# Patient Record
Sex: MALE
[De-identification: names, ages, dates, MRNs, and addresses within clinical notes are randomized per-mention and may not be internally consistent; named-entity substitution may affect disease eponyms.]

---

## 2018-01-20 ENCOUNTER — HOSPITAL ENCOUNTER (EMERGENCY)
Dept: HOSPITAL 88 - ER | Age: 38
Discharge: HOME | End: 2018-01-20
Payer: COMMERCIAL

## 2018-01-20 VITALS — HEIGHT: 65 IN | BODY MASS INDEX: 28.99 KG/M2 | WEIGHT: 174 LBS

## 2018-01-20 VITALS — DIASTOLIC BLOOD PRESSURE: 96 MMHG | SYSTOLIC BLOOD PRESSURE: 136 MMHG

## 2018-01-20 DIAGNOSIS — J11.1: ICD-10-CM

## 2018-01-20 DIAGNOSIS — R50.9: Primary | ICD-10-CM

## 2018-01-20 DIAGNOSIS — R05: ICD-10-CM

## 2018-01-20 LAB
ALBUMIN SERPL-MCNC: 3.7 G/DL (ref 3.5–5)
ALBUMIN/GLOB SERPL: 0.9 {RATIO} (ref 0.8–2)
ALP SERPL-CCNC: 115 IU/L (ref 40–150)
ALT SERPL-CCNC: 13 IU/L (ref 0–55)
ANION GAP SERPL CALC-SCNC: 13.4 MMOL/L (ref 8–16)
BASOPHILS # BLD AUTO: 0 10*3/UL (ref 0–0.1)
BASOPHILS NFR BLD AUTO: 0.5 % (ref 0–1)
BUN SERPL-MCNC: 18 MG/DL (ref 7–26)
BUN/CREAT SERPL: 3 (ref 6–25)
CALCIUM SERPL-MCNC: 8.7 MG/DL (ref 8.4–10.2)
CHLORIDE SERPL-SCNC: 103 MMOL/L (ref 98–107)
CO2 SERPL-SCNC: 30 MMOL/L (ref 22–29)
DEPRECATED NEUTROPHILS # BLD AUTO: 2.5 10*3/UL (ref 2.1–6.9)
EGFRCR SERPLBLD CKD-EPI 2021: 11 ML/MIN (ref 60–?)
EOSINOPHIL # BLD AUTO: 0.2 10*3/UL (ref 0–0.4)
EOSINOPHIL NFR BLD AUTO: 4.1 % (ref 0–6)
ERYTHROCYTE [DISTWIDTH] IN CORD BLOOD: 14.8 % (ref 11.7–14.4)
FLUAV + FLUBV AG SPEC IF: (no result)
GLOBULIN PLAS-MCNC: 3.9 G/DL (ref 2.3–3.5)
GLUCOSE SERPLBLD-MCNC: 102 MG/DL (ref 74–118)
HCT VFR BLD AUTO: 38.3 % (ref 38.2–49.6)
HGB BLD-MCNC: 12.9 G/DL (ref 14–18)
LYMPHOCYTES # BLD: 0.7 10*3/UL (ref 1–3.2)
LYMPHOCYTES NFR BLD AUTO: 16.7 % (ref 18–39.1)
MCH RBC QN AUTO: 31.5 PG (ref 28–32)
MCHC RBC AUTO-ENTMCNC: 33.7 G/DL (ref 31–35)
MCV RBC AUTO: 93.4 FL (ref 81–99)
MONOCYTES # BLD AUTO: 0.6 10*3/UL (ref 0.2–0.8)
MONOCYTES NFR BLD AUTO: 15.4 % (ref 4.4–11.3)
NEUTS SEG NFR BLD AUTO: 63 % (ref 38.7–80)
PLATELET # BLD AUTO: 108 X10E3/UL (ref 140–360)
POTASSIUM SERPL-SCNC: 3.4 MMOL/L (ref 3.5–5.1)
RBC # BLD AUTO: 4.1 X10E6/UL (ref 4.3–5.7)
S PYO AG THROAT QL: NEGATIVE
SODIUM SERPL-SCNC: 143 MMOL/L (ref 136–145)

## 2018-01-20 PROCEDURE — 71046 X-RAY EXAM CHEST 2 VIEWS: CPT

## 2018-01-20 PROCEDURE — 87400 INFLUENZA A/B EACH AG IA: CPT

## 2018-01-20 PROCEDURE — 99284 EMERGENCY DEPT VISIT MOD MDM: CPT

## 2018-01-20 PROCEDURE — 87070 CULTURE OTHR SPECIMN AEROBIC: CPT

## 2018-01-20 PROCEDURE — 36415 COLL VENOUS BLD VENIPUNCTURE: CPT

## 2018-01-20 PROCEDURE — 85025 COMPLETE CBC W/AUTO DIFF WBC: CPT

## 2018-01-20 PROCEDURE — 83518 IMMUNOASSAY DIPSTICK: CPT

## 2018-01-20 PROCEDURE — 71020: CPT

## 2018-01-20 PROCEDURE — 80053 COMPREHEN METABOLIC PANEL: CPT

## 2018-01-20 NOTE — DIAGNOSTIC IMAGING REPORT
EXAMINATION: Chest,  CHEST 2 VIEWS    



INDICATION: Cough.



COMPARISON:  Chest 2 views 11/20/2017

     

FINDINGS:    



LINES:  None.



Heart:  Normal cardiac silhouette.



Vascular: The pulmonary vasculature is within normal limits.  



Mediastinum: No mediastinal, hilar, or axillary mass or lymphadenopathy.



Lungs: No parenchymal mass.  No focal consolidation.



Pleura:  No pleural effusion.  No pneumothorax.



Bones: No acute osseous abnormality.  



Soft tissues:  Normal.



Impression: 

No acute radiographic abnormality.



Signed by: Dr. Jorge Graves M.D. on 1/20/2018 1:18 PM

## 2018-03-12 ENCOUNTER — HOSPITAL ENCOUNTER (INPATIENT)
Dept: HOSPITAL 88 - ER | Age: 38
LOS: 2 days | Discharge: HOME | DRG: 177 | End: 2018-03-14
Attending: INTERNAL MEDICINE | Admitting: INTERNAL MEDICINE
Payer: COMMERCIAL

## 2018-03-12 VITALS — DIASTOLIC BLOOD PRESSURE: 97 MMHG | SYSTOLIC BLOOD PRESSURE: 150 MMHG

## 2018-03-12 VITALS — SYSTOLIC BLOOD PRESSURE: 166 MMHG | DIASTOLIC BLOOD PRESSURE: 102 MMHG

## 2018-03-12 VITALS — SYSTOLIC BLOOD PRESSURE: 156 MMHG | DIASTOLIC BLOOD PRESSURE: 89 MMHG

## 2018-03-12 VITALS — BODY MASS INDEX: 31.13 KG/M2 | WEIGHT: 198.37 LBS | HEIGHT: 67 IN

## 2018-03-12 VITALS — DIASTOLIC BLOOD PRESSURE: 89 MMHG | SYSTOLIC BLOOD PRESSURE: 156 MMHG

## 2018-03-12 VITALS — DIASTOLIC BLOOD PRESSURE: 101 MMHG | SYSTOLIC BLOOD PRESSURE: 163 MMHG

## 2018-03-12 VITALS — SYSTOLIC BLOOD PRESSURE: 150 MMHG | DIASTOLIC BLOOD PRESSURE: 97 MMHG

## 2018-03-12 DIAGNOSIS — I50.9: ICD-10-CM

## 2018-03-12 DIAGNOSIS — I13.2: ICD-10-CM

## 2018-03-12 DIAGNOSIS — J69.0: Primary | ICD-10-CM

## 2018-03-12 DIAGNOSIS — J15.9: ICD-10-CM

## 2018-03-12 DIAGNOSIS — N25.81: ICD-10-CM

## 2018-03-12 DIAGNOSIS — I25.10: ICD-10-CM

## 2018-03-12 DIAGNOSIS — Z99.2: ICD-10-CM

## 2018-03-12 DIAGNOSIS — N18.6: ICD-10-CM

## 2018-03-12 LAB
ALBUMIN SERPL-MCNC: 3.1 G/DL (ref 3.5–5)
ALBUMIN/GLOB SERPL: 0.8 {RATIO} (ref 0.8–2)
ALP SERPL-CCNC: 92 IU/L (ref 40–150)
ALT SERPL-CCNC: 11 IU/L (ref 0–55)
ANION GAP SERPL CALC-SCNC: 16.8 MMOL/L (ref 8–16)
BACTERIA URNS QL MICRO: (no result) /HPF
BASOPHILS # BLD AUTO: 0 10*3/UL (ref 0–0.1)
BASOPHILS NFR BLD AUTO: 0.6 % (ref 0–1)
BILIRUB UR QL: NEGATIVE
BNP BLD-MCNC: 1786.5 PG/ML (ref 0–100)
BUN SERPL-MCNC: 30 MG/DL (ref 7–26)
BUN/CREAT SERPL: 3 (ref 6–25)
CALCIUM SERPL-MCNC: 7.8 MG/DL (ref 8.4–10.2)
CHLORIDE SERPL-SCNC: 95 MMOL/L (ref 98–107)
CK MB SERPL-MCNC: 0.3 NG/ML (ref 0–5)
CK SERPL-CCNC: 49 IU/L (ref 30–200)
CK SERPL-CCNC: 54 IU/L (ref 30–200)
CK SERPL-CCNC: 65 IU/L (ref 30–200)
CLARITY UR: CLEAR
CO2 SERPL-SCNC: 28 MMOL/L (ref 22–29)
COLOR UR: YELLOW
DEPRECATED APTT PLAS QN: 36.4 SECONDS (ref 23.8–35.5)
DEPRECATED INR PLAS: 1.23
DEPRECATED NEUTROPHILS # BLD AUTO: 5.2 10*3/UL (ref 2.1–6.9)
DEPRECATED RBC URNS MANUAL-ACNC: (no result) /HPF (ref 0–5)
EGFRCR SERPLBLD CKD-EPI 2021: 6 ML/MIN (ref 60–?)
EOSINOPHIL # BLD AUTO: 0.3 10*3/UL (ref 0–0.4)
EOSINOPHIL NFR BLD AUTO: 3.7 % (ref 0–6)
EPI CELLS URNS QL MICRO: (no result) /LPF
ERYTHROCYTE [DISTWIDTH] IN CORD BLOOD: 13.3 % (ref 11.7–14.4)
GLOBULIN PLAS-MCNC: 4 G/DL (ref 2.3–3.5)
GLUCOSE SERPLBLD-MCNC: 100 MG/DL (ref 74–118)
HCT VFR BLD AUTO: 30.1 % (ref 38.2–49.6)
HGB BLD-MCNC: 10.2 G/DL (ref 14–18)
KETONES UR QL STRIP.AUTO: NEGATIVE
LEUKOCYTE ESTERASE UR QL STRIP.AUTO: NEGATIVE
LYMPHOCYTES # BLD: 0.8 10*3/UL (ref 1–3.2)
LYMPHOCYTES NFR BLD AUTO: 11.8 % (ref 18–39.1)
MAGNESIUM SERPL-MCNC: 1.9 MG/DL (ref 1.3–2.1)
MCH RBC QN AUTO: 30.2 PG (ref 28–32)
MCHC RBC AUTO-ENTMCNC: 33.9 G/DL (ref 31–35)
MCV RBC AUTO: 89.1 FL (ref 81–99)
MONOCYTES # BLD AUTO: 0.8 10*3/UL (ref 0.2–0.8)
MONOCYTES NFR BLD AUTO: 10.6 % (ref 4.4–11.3)
NEUTS SEG NFR BLD AUTO: 72.9 % (ref 38.7–80)
NITRITE UR QL STRIP.AUTO: NEGATIVE
PLATELET # BLD AUTO: 91 X10E3/UL (ref 140–360)
POTASSIUM SERPL-SCNC: 3.8 MMOL/L (ref 3.5–5.1)
PROT UR QL STRIP.AUTO: (no result)
PROTHROMBIN TIME: 14.6 SECONDS (ref 11.9–14.5)
RBC # BLD AUTO: 3.38 X10E6/UL (ref 4.3–5.7)
SODIUM SERPL-SCNC: 136 MMOL/L (ref 136–145)
SP GR UR STRIP: 1.01 (ref 1.01–1.02)
UROBILINOGEN UR STRIP-MCNC: 0.2 MG/DL (ref 0.2–1)
WBC #/AREA URNS HPF: (no result) /HPF (ref 0–5)

## 2018-03-12 PROCEDURE — 93306 TTE W/DOPPLER COMPLETE: CPT

## 2018-03-12 PROCEDURE — 87071 CULTURE AEROBIC QUANT OTHER: CPT

## 2018-03-12 PROCEDURE — 84484 ASSAY OF TROPONIN QUANT: CPT

## 2018-03-12 PROCEDURE — 85730 THROMBOPLASTIN TIME PARTIAL: CPT

## 2018-03-12 PROCEDURE — 71046 X-RAY EXAM CHEST 2 VIEWS: CPT

## 2018-03-12 PROCEDURE — 87086 URINE CULTURE/COLONY COUNT: CPT

## 2018-03-12 PROCEDURE — 85610 PROTHROMBIN TIME: CPT

## 2018-03-12 PROCEDURE — 5A1D70Z PERFORMANCE OF URINARY FILTRATION, INTERMITTENT, LESS THAN 6 HOURS PER DAY: ICD-10-PCS

## 2018-03-12 PROCEDURE — 36415 COLL VENOUS BLD VENIPUNCTURE: CPT

## 2018-03-12 PROCEDURE — 82553 CREATINE MB FRACTION: CPT

## 2018-03-12 PROCEDURE — 87070 CULTURE OTHR SPECIMN AEROBIC: CPT

## 2018-03-12 PROCEDURE — 71045 X-RAY EXAM CHEST 1 VIEW: CPT

## 2018-03-12 PROCEDURE — 99284 EMERGENCY DEPT VISIT MOD MDM: CPT

## 2018-03-12 PROCEDURE — 87186 SC STD MICRODIL/AGAR DIL: CPT

## 2018-03-12 PROCEDURE — 70450 CT HEAD/BRAIN W/O DYE: CPT

## 2018-03-12 PROCEDURE — 87400 INFLUENZA A/B EACH AG IA: CPT

## 2018-03-12 PROCEDURE — 85025 COMPLETE CBC W/AUTO DIFF WBC: CPT

## 2018-03-12 PROCEDURE — 83880 ASSAY OF NATRIURETIC PEPTIDE: CPT

## 2018-03-12 PROCEDURE — 83735 ASSAY OF MAGNESIUM: CPT

## 2018-03-12 PROCEDURE — 82550 ASSAY OF CK (CPK): CPT

## 2018-03-12 PROCEDURE — 80053 COMPREHEN METABOLIC PANEL: CPT

## 2018-03-12 PROCEDURE — 83605 ASSAY OF LACTIC ACID: CPT

## 2018-03-12 PROCEDURE — 96365 THER/PROPH/DIAG IV INF INIT: CPT

## 2018-03-12 PROCEDURE — 87340 HEPATITIS B SURFACE AG IA: CPT

## 2018-03-12 PROCEDURE — 96374 THER/PROPH/DIAG INJ IV PUSH: CPT

## 2018-03-12 PROCEDURE — 87040 BLOOD CULTURE FOR BACTERIA: CPT

## 2018-03-12 PROCEDURE — 93005 ELECTROCARDIOGRAM TRACING: CPT

## 2018-03-12 PROCEDURE — 87205 SMEAR GRAM STAIN: CPT

## 2018-03-12 PROCEDURE — 81001 URINALYSIS AUTO W/SCOPE: CPT

## 2018-03-12 PROCEDURE — 90962 ESRD SERV 1 VISIT P MO 20+: CPT

## 2018-03-12 RX ADMIN — SODIUM CHLORIDE PRN MG: 900 INJECTION INTRAVENOUS at 13:15

## 2018-03-12 RX ADMIN — TAZOBACTAM SODIUM AND PIPERACILLIN SODIUM SCH MLS/HR: 250; 2 INJECTION, SOLUTION INTRAVENOUS at 14:18

## 2018-03-12 RX ADMIN — SEVELAMER CARBONATE SCH MG: 800 TABLET, FILM COATED ORAL at 17:00

## 2018-03-12 RX ADMIN — SODIUM CHLORIDE PRN MG: 900 INJECTION INTRAVENOUS at 16:35

## 2018-03-12 RX ADMIN — CLINDAMYCIN PHOSPHATE SCH MLS/HR: 18 INJECTION, SOLUTION INTRAVENOUS at 04:51

## 2018-03-12 RX ADMIN — CLINDAMYCIN PHOSPHATE SCH MLS/HR: 18 INJECTION, SOLUTION INTRAVENOUS at 05:42

## 2018-03-12 RX ADMIN — Medication PRN MG: at 16:12

## 2018-03-12 RX ADMIN — Medication PRN MG: at 21:17

## 2018-03-12 RX ADMIN — TAZOBACTAM SODIUM AND PIPERACILLIN SODIUM SCH MLS/HR: 250; 2 INJECTION, SOLUTION INTRAVENOUS at 21:17

## 2018-03-12 RX ADMIN — Medication SCH MG: at 16:11

## 2018-03-12 RX ADMIN — SODIUM CHLORIDE SCH GM: 9 INJECTION, SOLUTION INTRAVENOUS at 16:12

## 2018-03-12 RX ADMIN — Medication PRN MG: at 22:22

## 2018-03-12 RX ADMIN — SODIUM CHLORIDE SCH GM: 9 INJECTION, SOLUTION INTRAVENOUS at 04:51

## 2018-03-12 NOTE — DIAGNOSTIC IMAGING REPORT
EXAMINATION:  CHEST 2 VIEWS    



INDICATION:           Cough, fever, history chest pain. 



COMPARISON:  1/20/2018

     

FINDINGS:

TUBES and LINES:  None.



LUNGS:  Lungs are not well inflated.  Interlobular septi thickening is noted by

the presence of Kerley B lines   Multifocal airspace disease predominantly

involving the lung bases and right middle lobe suspicious for multifocal

infection



PLEURA:  Trace of right pleural effusion present



HEART AND MEDIASTINUM:  Cardiac size is mildly enlarged.    



BONES AND SOFT TISSUES:  No acute osseous lesion.  Soft tissues are

unremarkable.



UPPER ABDOMEN: No free air under the diaphragm.    



IMPRESSION: 

1.  Findings are compatible with multifocal pneumonia. Follow-up until

resolution is recommended

2.  Fluid overload and trace of right pleural effusion is also present.



Signed by: Dr. Max Becker M.D. on 3/12/2018 4:18 AM

## 2018-03-12 NOTE — XMS REPORT
Patient Summary Document

 Created on: 2018



BORIS PURI

External Reference #: [id number]

: 1980

Sex: Male



Demographics







 Address  [address] [place] [address]  [address]

 

 Home Phone  [phone]

 

 Preferred Language  Unknown

 

 Marital Status  Unknown

 

 Congregational Affiliation  Unknown

 

 Race  Unknown

 

 Additional Race(s) 

 

 

 Ethnic Group  Unknown





Author







 Author  [facility]

 

 Address  Unknown

 

 Phone  Unavailable







Care Team Providers







 Care Team Member Name  Role  Phone

 

 YENIFER COTTRELL  Unavailable  Unavailable

 

 NAKUL SARMIENTO  Unavailable  Unavailable

 

 NOREEN FRIED  Unavailable  Unavailable

 

 BIGG LAMBERT  Unavailable  Unavailable







Problems

This patient has no known problems.



Allergies, Adverse Reactions, Alerts

This patient has no known allergies or adverse reactions.



Medications

This patient has no known medications.



Results







 Test Description  Test Time  Test Comments  Text Results  Atomic Results  
Result Comments









 FLOW PRA CLASS I AND II  2017-10-09 12:08:00       









   

 

 DATE OF SERUM (BEAKER) (test code=)  531739      

 

 SERUM # (BEAKER) (test code=2290)  445918      

 

 FLOW PRA CLASS I AND II (test code=2421)  See Scanned Report      





FLOW PRA CLASS I AND II2017-07-10 13:10:00* 





 Test Item  Value  Reference Range  Comments

 

 DATE OF SERUM (BEAKER) (test code=)  905900      

 

 SERUM # (BEAKER) (test code=2290)  035208      

 

 FLOW PRA CLASS I AND II (test code=2421)  See Scanned Report      





HEPATITIS B SURFACE [id number] 14:12:00* 





 Test Item  Value  Reference Range  Comments

 

 HEPATITIS B SURFACE ANTIBODY (BEAKER) (test code=647)  139.4 mIU/mL  <8.0   





FLOW PRA CLASS I AND [id number] 06:49:00* 





 Test Item  Value  Reference Range  Comments

 

 DATE OF SERUM (BEAKER) (test code=)  968630      

 

 SERUM # (BEAKER) (test code=2290)  545985      

 

 FLOW PRA CLASS I AND II (test code=2421)  See Scanned Report      





CHEST 2 VIEWS    [address] [address]      Patient Name: BORIS PURI   MR #: 
[id number]    : 1980 Age/Sex: 37/M  Acct #: [id number] Req #: [id number]
[id number]  Adm Physician:     Ordered by: CLARIBEL GARCIA NP  Report #: [id number]
[id number]   Location: ER  Room/Bed:     _____________________________________________
______________________________________________________    Procedure: [id number] 
DX/CHEST 2 VIEWS  Exam Date: 18                            Exam Time: 
1235       REPORT STATUS: Signed    EXAMINATION: Chest,  CHEST 2 VIEWS          
INDICATION: Cough.      COMPARISON:  Chest 2 views 2017           FINDINGS
:          LINES:  None.      Heart:  Normal cardiac silhouette.      Vascular: 
The pulmonary vasculature is within normal limits.        Mediastinum: No 
mediastinal, hilar, or axillary mass or lymphadenopathy.      Lungs: No 
parenchymal mass.  No focal consolidation.      Pleura:  No pleural effusion.  
No pneumothorax.      Bones: No acute osseous abnormality.        Soft tissues:
  Normal.      Impression:    No acute radiographic abnormality.      Signed by
: Dr. Celestine Pavon M.D. on 2018 1:18 PM        Dictated By: CELESTINE PAVON MD
  Electronically Signed By: CELESTINE PAVON MD on 18  Transcribed By: 
SHEFALI on 18       COPY TO:   CLARIBEL GARCIA NP        CHEST 2 
VIEWS    [place] [address]      Patient Name: BORIS PURI   MR #: [id number]    
: 1980 Age/Sex: 37/M  Acct #: [id number] Req #: [id number]  Adm 
Physician:     Ordered by: NAKUL SARMIENTO MD  Report #: [id number]   Location: 
RAD  Room/Bed:     _____________________________________________________________
______________________________________    Procedure: [id number] DX/CHEST 2 VIEWS
  Exam Date: 17                            Exam Time: 1045       REPORT 
STATUS: Signed    PROCEDURE:   CHEST 2 VIEWS   TECHNIQUE:   PA lateral chest   
INDICATION:   Followup pneumonia   COMPARISON:   None.       FINDINGS:   Near-
complete resolution of right upper lobe airspace opacity relative    to 2017. No cavitation. Lungs are otherwise clear. No pleural    effusions. Normal 
heart size. Intact skeleton.       CONCLUSION:   Interval improvement of 
airspace opacity at the peripheral right upper    lobe consistent with 
resolving pneumonia. There is incomplete    resolution and therefore additional 
followup chest radiograph is    recommended in 6 weeks.               Dictated 
by:  Ashwin Caal M.D. on 2017 at 11:10        Electronically 
approved by:  Ashwin Caal M.D. on 2017 at    11:10                
Dictated By: ASHWIN CAAL MD  Electronically Signed By: ASHWIN CAAL MD on 
17 1110  Transcribed By: ROBIN on 17 1110       COPY TO:   NAKUL SARMIENTO MD        CT CHEST W    [place] [address]      Patient Name: BORIS PURI   MR #: [id number]    : 1980 Age/Sex: 37/M  Acct #: [id number] Req 
#: [id number]  Adm Physician: NOREEN FRIED MD    Ordered by: BEENA ANDREWS MD  
Report #: [id number]   Location: MED/SURG2  Room/Bed: [facility]    __________________
________________________________________________________________________________
_    Procedure: [id number] CT/CT CHEST W  Exam Date: 10/23/17                   
         Exam Time: 2301       REPORT STATUS: Signed    EXAM: CT CHEST W   DATE
: 10/23/2017 6:40 PM  Time stamp on exam: 2305 hours   INDICATION:  Pleurisy, 
end-stage renal disease, pneumonia   COMPARISON:  PA and lateral view of the 
chest 2017   TECHNIQUE: Multidetector CT scanning of the chest was 
performed.  Coronal and   sagittal multiplanar reformations were obtained. 
Routine protocol performed.   IV Contrast: 100 cc Isovue-370   CTDIvol has been 
reviewed. It is below the limits set by the Radiation Protocol   Committee (RPC)
.      FINDINGS:   LUNGS AND AIRWAYS:    There is a consolidation in the 
posterior aspect of the right upper lung along   the fissure. Within the 
consolidation is a central bubbly lucency measuring   approximately 1.5 cm (
sagittal image 31).      Nonspecific 6 mm nodule in the right upper lung 
anteriorly (series 3, image   48).      Linear atelectasis/scarring in the 
right middle lobe, lingula and bilateral   lung bases. Bibasilar bronchial 
thickening.      PLEURA: Trace bilateral pleural effusions.      HEART, 
MEDIASTINUM, VESSELS: Several mediastinal, subcarinal and right hilar   
subcentimeter lymph nodes consistent with reactive lymph nodes. The heart and   
great vessels are unremarkable.      UPPER ABDOMEN: No acute finding.      
MUSCULOSKELETAL: No acute findings.      IMPRESSION:   Findings are consistent 
with pneumonia in the right upper lung. If symptoms do   not improve, follow-up 
CT is recommended for possible early necrosis/abscess   formation within the 
consolidation.            Signed by: Dr. Eileen Parish M.D. on 10/24/2017 
12:16 AM        Dictated By: EILEEN PARISH MD  Electronically Signed By: EILEEN PARISH MD on 10/24/17 0016  Transcribed By: SHEFALI on 10/24/17 0016       
COPY TO:   BEENA ANDREWS MD        CHEST 2 VIEWS    [place] [address]      Patient 
Name: BORIS PURI   MR #: [id number]    : 1980 Age/Sex: 37/M  Acct #
: [id number] Req #: [id number]  Adm Physician:     Ordered by: BRIELLE WEEKS MD  Report #: [id number]   Location: ER  Room/Bed:     __________________________
_________________________________________________________________________    
Procedure: [id number] DX/CHEST 2 VIEWS  Exam Date: 10/22/17                     
       Exam Time:        REPORT STATUS: Signed    EXAMINATION:  CHEST 2 
VIEWS          INDICATION:           Upper back pain. Fever, vomiting for 2 
days       COMPARISON:  Chest x-ray on 2017 and 2013           
FINDINGS:   TUBES and LINES:  None.      LUNGS:  Lungs are not well inflated.  
Right lower lobe and right middle lobe   patchy airspace opacity with areas of 
nodularity   are compatible with evolving   infection.      PLEURA:  Focal 
right pleural thickening at the mid lung is suspicious for   loculated effusion 
versus pleural proliferative disease      HEART AND MEDIASTINUM:  The 
cardiomediastinal silhouette is unremarkable.          BONES AND SOFT TISSUES:  
No acute osseous lesion.  Soft tissues are   unremarkable.      UPPER ABDOMEN: 
No free air under the diaphragm.          IMPRESSION:    Findings are 
suspicious for infectious process in the right lower lobe with   loculated 
effusion. However, further evaluation with CT of the chest with IV   contrast 
is recommended to exclude the presence of underlying solid mass.         Signed 
by: Dr. Max Becker M.D. on 10/22/2017 10:20 PM        Dictated By: MAX CHAUDHARI MD  Electronically Signed By: MAX CHAUDHARI MD on 10/22/17 
2220  Transcribed By: SHEFALI on 10/22/17 2220       COPY TO:   BRIELLE WEEKS MD

## 2018-03-12 NOTE — HISTORY AND PHYSICAL
Mr. Landaverde is a pleasant  man whose 38th birthday is today.



CHIEF COMPLAINT:  He presented to the emergency room overnight with fever, 

cough, nausea and vomiting over the past several days.  



HISTORY OF PRESENT ILLNESS:  The patient reports he has dialysis on 

Tuesday, Thursday and Saturday.  He has attended these dialysis sessions 

without any problem but noted some nausea and vomiting and then cough.



PAST MEDICAL HISTORY:  Significant for a similar episode in October 2017 

when he was hospitalized at [facility] and found to have 

pneumonia.  He has chronic end-stage renal failure on hemodialysis.  He had 

severe hypertension before beginning dialysis.  He has had remote inguinal 

hernia repair.  



HOME MEDICATIONS:  Please see the chart.



PERSONAL AND SOCIAL HISTORY:  He does not smoke or drink, and he continues 

to work.



PHYSICAL EXAMINATION:  

GENERAL:  At this time shows a pleasant, alert  man who is 

comfortable. 

VITAL SIGNS:  Blood pressure is 150/90.  

HEENT:  Unremarkable. 

NECK:  No jugular venous distention, no bruits.

THORAX:  Heart sounds S1 and S2 are equal.  No murmurs.  Lungs are 

relatively clear. 

ABDOMEN:  Protuberant.  Normal bowel sounds.

EXTREMITIES:  With no cyanosis, clubbing or edema.  There is a functioning 

AV graft in the left arm.



PERTINENT LABORATORY STUDIES:   Show a BUN of 30 and creatinine 9.5, 

hemoglobin 10.2.  BNP is 1786.





ASSESSMENT:  

1. Pneumonia suggested by chest x-ray.

2. End-stage renal failure.

3. Congestive heart failure by BNP elevated with no previously known 

cardiac disease.  



PLAN:  The patient is on antibiotics.  Will ask for continued dialysis 

support and will check echocardiogram and consider further cardiac 

evaluation.













DD:  03/12/2018 12:20

DT:  03/12/2018 12:51

Job#:  [id number]

cc:MD BEENA JEREZ MD

## 2018-03-12 NOTE — DIAGNOSTIC IMAGING REPORT
Exam: Head CT without contrast

History:  Headache, vomiting

Comparison studies:  8/7/2014



Technique:

Axial images were obtained from the skull base to the vertex.

Coronal and sagittal images reconstructed from the axial data.

Intravenous contrast: None



Findings:



Scalp: No abnormalities.

Bones: No fractures, blastic or lytic lesions.



Brain sulci: Appropriate for age.

Ventricles: Normal in size and configuration. No hydrocephalus.

Extra-axial spaces: No masses, no fluid collection. 



Parenchyma: 

No abnormal densities. 

No masses, hemorrhage, acute or chronic vascular insults.



Sellar/suprasellar region: No abnormalities.

Craniocervical junction: Patent foramen magnum. No Chiari one malformation.



Incidental findings: 

None.



IMPRESSION:

 

1.  No acute intracranial abnormalities.



Signed by: Dr. Max Becker M.D. on 3/12/2018 4:13 AM

## 2018-03-13 VITALS — SYSTOLIC BLOOD PRESSURE: 140 MMHG | DIASTOLIC BLOOD PRESSURE: 95 MMHG

## 2018-03-13 VITALS — DIASTOLIC BLOOD PRESSURE: 85 MMHG | SYSTOLIC BLOOD PRESSURE: 128 MMHG

## 2018-03-13 VITALS — SYSTOLIC BLOOD PRESSURE: 147 MMHG | DIASTOLIC BLOOD PRESSURE: 95 MMHG

## 2018-03-13 VITALS — SYSTOLIC BLOOD PRESSURE: 141 MMHG | DIASTOLIC BLOOD PRESSURE: 95 MMHG

## 2018-03-13 VITALS — SYSTOLIC BLOOD PRESSURE: 144 MMHG | DIASTOLIC BLOOD PRESSURE: 88 MMHG

## 2018-03-13 VITALS — SYSTOLIC BLOOD PRESSURE: 140 MMHG | DIASTOLIC BLOOD PRESSURE: 87 MMHG

## 2018-03-13 LAB
ALBUMIN SERPL-MCNC: 2.6 G/DL (ref 3.5–5)
ALBUMIN/GLOB SERPL: 0.7 {RATIO} (ref 0.8–2)
ALP SERPL-CCNC: 74 IU/L (ref 40–150)
ALT SERPL-CCNC: 9 IU/L (ref 0–55)
ANION GAP SERPL CALC-SCNC: 14.2 MMOL/L (ref 8–16)
BASOPHILS # BLD AUTO: 0 10*3/UL (ref 0–0.1)
BASOPHILS NFR BLD AUTO: 0.4 % (ref 0–1)
BUN SERPL-MCNC: 29 MG/DL (ref 7–26)
BUN/CREAT SERPL: 3 (ref 6–25)
CALCIUM SERPL-MCNC: 7.3 MG/DL (ref 8.4–10.2)
CHLORIDE SERPL-SCNC: 97 MMOL/L (ref 98–107)
CO2 SERPL-SCNC: 28 MMOL/L (ref 22–29)
DEPRECATED NEUTROPHILS # BLD AUTO: 5.1 10*3/UL (ref 2.1–6.9)
EGFRCR SERPLBLD CKD-EPI 2021: 7 ML/MIN (ref 60–?)
EOSINOPHIL # BLD AUTO: 0.4 10*3/UL (ref 0–0.4)
EOSINOPHIL NFR BLD AUTO: 5 % (ref 0–6)
ERYTHROCYTE [DISTWIDTH] IN CORD BLOOD: 13.3 % (ref 11.7–14.4)
GLOBULIN PLAS-MCNC: 3.8 G/DL (ref 2.3–3.5)
GLUCOSE SERPLBLD-MCNC: 98 MG/DL (ref 74–118)
HCT VFR BLD AUTO: 27 % (ref 38.2–49.6)
HGB BLD-MCNC: 8.9 G/DL (ref 14–18)
LYMPHOCYTES # BLD: 0.8 10*3/UL (ref 1–3.2)
LYMPHOCYTES NFR BLD AUTO: 11 % (ref 18–39.1)
MCH RBC QN AUTO: 30.1 PG (ref 28–32)
MCHC RBC AUTO-ENTMCNC: 33 G/DL (ref 31–35)
MCV RBC AUTO: 91.2 FL (ref 81–99)
MONOCYTES # BLD AUTO: 0.9 10*3/UL (ref 0.2–0.8)
MONOCYTES NFR BLD AUTO: 12.5 % (ref 4.4–11.3)
NEUTS SEG NFR BLD AUTO: 70.8 % (ref 38.7–80)
PLATELET # BLD AUTO: 98 X10E3/UL (ref 140–360)
POTASSIUM SERPL-SCNC: 4.2 MMOL/L (ref 3.5–5.1)
RBC # BLD AUTO: 2.96 X10E6/UL (ref 4.3–5.7)
SODIUM SERPL-SCNC: 135 MMOL/L (ref 136–145)

## 2018-03-13 RX ADMIN — AZITHROMYCIN SCH MG: 250 TABLET, FILM COATED ORAL at 09:09

## 2018-03-13 RX ADMIN — TAZOBACTAM SODIUM AND PIPERACILLIN SODIUM SCH MLS/HR: 250; 2 INJECTION, SOLUTION INTRAVENOUS at 14:00

## 2018-03-13 RX ADMIN — Medication PRN MG: at 06:08

## 2018-03-13 RX ADMIN — SEVELAMER CARBONATE SCH MG: 800 TABLET, FILM COATED ORAL at 17:15

## 2018-03-13 RX ADMIN — SODIUM CHLORIDE SCH GM: 9 INJECTION, SOLUTION INTRAVENOUS at 04:17

## 2018-03-13 RX ADMIN — TAZOBACTAM SODIUM AND PIPERACILLIN SODIUM SCH MLS/HR: 250; 2 INJECTION, SOLUTION INTRAVENOUS at 05:49

## 2018-03-13 RX ADMIN — SEVELAMER CARBONATE SCH MG: 800 TABLET, FILM COATED ORAL at 09:09

## 2018-03-13 RX ADMIN — TAZOBACTAM SODIUM AND PIPERACILLIN SODIUM SCH MLS/HR: 250; 2 INJECTION, SOLUTION INTRAVENOUS at 22:05

## 2018-03-13 RX ADMIN — SEVELAMER CARBONATE SCH MG: 800 TABLET, FILM COATED ORAL at 12:00

## 2018-03-13 RX ADMIN — Medication PRN MG: at 22:49

## 2018-03-13 NOTE — DIAGNOSTIC IMAGING REPORT
CHEST SINGLE (PORTABLE), 3/13/2018 5:00 AM



Technique: CHEST SINGLE (PORTABLE)

Comparison: 3/12/2018

Clinical history: Pneumonia



Findings:

See Impression



Impression:

1. Stable mildly enlarged cardiac silhouette.

2. Increased bilateral diffuse opacities which could be due to edema and/or

infection.

3. Tiny bilateral effusions.



Signed by: Dr Airam King MD on 3/13/2018 6:39 AM

## 2018-03-14 VITALS — SYSTOLIC BLOOD PRESSURE: 148 MMHG | DIASTOLIC BLOOD PRESSURE: 94 MMHG

## 2018-03-14 VITALS — DIASTOLIC BLOOD PRESSURE: 91 MMHG | SYSTOLIC BLOOD PRESSURE: 144 MMHG

## 2018-03-14 VITALS — SYSTOLIC BLOOD PRESSURE: 138 MMHG | DIASTOLIC BLOOD PRESSURE: 88 MMHG

## 2018-03-14 VITALS — SYSTOLIC BLOOD PRESSURE: 151 MMHG | DIASTOLIC BLOOD PRESSURE: 98 MMHG

## 2018-03-14 RX ADMIN — AZITHROMYCIN SCH MG: 250 TABLET, FILM COATED ORAL at 09:11

## 2018-03-14 RX ADMIN — Medication SCH MG: at 09:12

## 2018-03-14 RX ADMIN — SEVELAMER CARBONATE SCH MG: 800 TABLET, FILM COATED ORAL at 08:49

## 2018-03-14 RX ADMIN — SEVELAMER CARBONATE SCH MG: 800 TABLET, FILM COATED ORAL at 12:30

## 2018-03-14 RX ADMIN — TAZOBACTAM SODIUM AND PIPERACILLIN SODIUM SCH MLS/HR: 250; 2 INJECTION, SOLUTION INTRAVENOUS at 06:01

## 2018-03-14 NOTE — DISCHARGE SUMMARY
HISTORY OF PRESENT ILLNESS:  Mr. Landaverde is a pleasant 38-year-old man who 

had his birthday during this hospitalization, who presented to the 

emergency room on the 12th with a complaint of cough and fever at home.



HOSPITAL COURSE:  Chest x-ray suggested multifocal pneumonia.  It appeared 

the patient had had some nausea and vomiting at home and may have aspirated 

gastric contents.



He was given broad-spectrum antibiotics, and he received dialysis on the 

12th and 13th.  Patient made rapid improvement on broad-spectrum antibiotic 

coverage and today is afebrile and feeling much better.  He is discharged 

to home to continue his previous medications of nifedipine and sevelamer.  

He will take azithromycin 250 mg daily for the next 5 days and will follow 

up with Dr. Tran on a regular basis and with Dr. Hines for dialysis.



DISCHARGE DIAGNOSES:  

1. Pneumonia.

2. Possible aspiration pneumonia.

3. End-stage renal failure.

4. Hypertension.  







 _________________________________

NOREEN FRIED MD



DD:  03/14/2018 13:02

DT:  03/14/2018 13:43

Job#:  [id number]

cc:ANTWAN HINES MD 

cc:BEENA ANDREWS MD

## 2018-03-22 ENCOUNTER — HOSPITAL ENCOUNTER (OUTPATIENT)
Dept: HOSPITAL 88 - ER | Age: 38
Setting detail: OBSERVATION
LOS: 1 days | Discharge: HOME | End: 2018-03-23
Attending: INTERNAL MEDICINE | Admitting: INTERNAL MEDICINE
Payer: COMMERCIAL

## 2018-03-22 VITALS — DIASTOLIC BLOOD PRESSURE: 98 MMHG | SYSTOLIC BLOOD PRESSURE: 153 MMHG

## 2018-03-22 VITALS — WEIGHT: 173 LBS | BODY MASS INDEX: 27.15 KG/M2 | HEIGHT: 67 IN

## 2018-03-22 VITALS — DIASTOLIC BLOOD PRESSURE: 99 MMHG | SYSTOLIC BLOOD PRESSURE: 157 MMHG

## 2018-03-22 DIAGNOSIS — Z99.2: ICD-10-CM

## 2018-03-22 DIAGNOSIS — D64.9: ICD-10-CM

## 2018-03-22 DIAGNOSIS — R51: ICD-10-CM

## 2018-03-22 DIAGNOSIS — I50.30: ICD-10-CM

## 2018-03-22 DIAGNOSIS — N18.6: ICD-10-CM

## 2018-03-22 DIAGNOSIS — I16.0: ICD-10-CM

## 2018-03-22 DIAGNOSIS — E66.3: ICD-10-CM

## 2018-03-22 DIAGNOSIS — J18.0: ICD-10-CM

## 2018-03-22 DIAGNOSIS — I13.2: Primary | ICD-10-CM

## 2018-03-22 LAB
ALBUMIN SERPL-MCNC: 3 G/DL (ref 3.5–5)
ALBUMIN/GLOB SERPL: 0.8 {RATIO} (ref 0.8–2)
ALP SERPL-CCNC: 94 IU/L (ref 40–150)
ALT SERPL-CCNC: 16 IU/L (ref 0–55)
ANION GAP SERPL CALC-SCNC: 14.2 MMOL/L (ref 8–16)
BACTERIA URNS QL MICRO: (no result) /HPF
BASOPHILS # BLD AUTO: 0 10*3/UL (ref 0–0.1)
BASOPHILS NFR BLD AUTO: 0.4 % (ref 0–1)
BILIRUB UR QL: NEGATIVE
BUN SERPL-MCNC: 40 MG/DL (ref 7–26)
BUN/CREAT SERPL: 5 (ref 6–25)
CALCIUM SERPL-MCNC: 8.1 MG/DL (ref 8.4–10.2)
CHLORIDE SERPL-SCNC: 100 MMOL/L (ref 98–107)
CK MB SERPL-MCNC: 0.6 NG/ML (ref 0–5)
CK SERPL-CCNC: 86 IU/L (ref 30–200)
CLARITY UR: (no result)
CO2 SERPL-SCNC: 29 MMOL/L (ref 22–29)
COLOR UR: YELLOW
DEPRECATED APTT PLAS QN: 34.1 SECONDS (ref 23.8–35.5)
DEPRECATED INR PLAS: 1.13
DEPRECATED NEUTROPHILS # BLD AUTO: 4.3 10*3/UL (ref 2.1–6.9)
DEPRECATED RBC URNS MANUAL-ACNC: (no result) /HPF (ref 0–5)
EGFRCR SERPLBLD CKD-EPI 2021: 7 ML/MIN (ref 60–?)
EOSINOPHIL # BLD AUTO: 0.6 10*3/UL (ref 0–0.4)
EOSINOPHIL NFR BLD AUTO: 8.3 % (ref 0–6)
EPI CELLS URNS QL MICRO: (no result) /LPF
ERYTHROCYTE [DISTWIDTH] IN CORD BLOOD: 14.1 % (ref 11.7–14.4)
FERRITIN SERPL-MCNC: 1501.59 NG/ML (ref 21.81–274.66)
GLOBULIN PLAS-MCNC: 3.7 G/DL (ref 2.3–3.5)
GLUCOSE SERPLBLD-MCNC: 97 MG/DL (ref 74–118)
HCT VFR BLD AUTO: 25.2 % (ref 38.2–49.6)
HGB BLD-MCNC: 8.4 G/DL (ref 14–18)
HYALINE CASTS #/AREA URNS LPF: (no result) /[LPF] (ref 0–1)
IRON SATN MFR SERPL: 28 % (ref 15–50)
IRON SERPL-MCNC: 76 UG/DL (ref 65–175)
KETONES UR QL STRIP.AUTO: NEGATIVE
LEUKOCYTE ESTERASE UR QL STRIP.AUTO: NEGATIVE
LYMPHOCYTES # BLD: 1.7 10*3/UL (ref 1–3.2)
LYMPHOCYTES NFR BLD AUTO: 23.3 % (ref 18–39.1)
MCH RBC QN AUTO: 29.6 PG (ref 28–32)
MCHC RBC AUTO-ENTMCNC: 33.3 G/DL (ref 31–35)
MCV RBC AUTO: 88.7 FL (ref 81–99)
MONOCYTES # BLD AUTO: 0.6 10*3/UL (ref 0.2–0.8)
MONOCYTES NFR BLD AUTO: 8.4 % (ref 4.4–11.3)
NEUTS SEG NFR BLD AUTO: 59.2 % (ref 38.7–80)
NITRITE UR QL STRIP.AUTO: NEGATIVE
PLATELET # BLD AUTO: 82 X10E3/UL (ref 140–360)
POTASSIUM SERPL-SCNC: 4.2 MMOL/L (ref 3.5–5.1)
PROT UR QL STRIP.AUTO: (no result)
PROTHROMBIN TIME: 13.6 SECONDS (ref 11.9–14.5)
RBC # BLD AUTO: 2.84 X10E6/UL (ref 4.3–5.7)
SODIUM SERPL-SCNC: 139 MMOL/L (ref 136–145)
SP GR UR STRIP: 1.01 (ref 1.01–1.02)
TIBC SERPL-MCNC: 270 UG/DL (ref 261–478)
TRANSFERRIN SERPL-MCNC: 193 MG/DL (ref 174–364)
UROBILINOGEN UR STRIP-MCNC: 0.2 MG/DL (ref 0.2–1)
WBC #/AREA URNS HPF: (no result) /HPF (ref 0–5)

## 2018-03-22 PROCEDURE — 71046 X-RAY EXAM CHEST 2 VIEWS: CPT

## 2018-03-22 PROCEDURE — 83540 ASSAY OF IRON: CPT

## 2018-03-22 PROCEDURE — 5A1D70Z PERFORMANCE OF URINARY FILTRATION, INTERMITTENT, LESS THAN 6 HOURS PER DAY: ICD-10-PCS

## 2018-03-22 PROCEDURE — 83880 ASSAY OF NATRIURETIC PEPTIDE: CPT

## 2018-03-22 PROCEDURE — 90962 ESRD SERV 1 VISIT P MO 20+: CPT

## 2018-03-22 PROCEDURE — 93005 ELECTROCARDIOGRAM TRACING: CPT

## 2018-03-22 PROCEDURE — 85730 THROMBOPLASTIN TIME PARTIAL: CPT

## 2018-03-22 PROCEDURE — 82553 CREATINE MB FRACTION: CPT

## 2018-03-22 PROCEDURE — 82728 ASSAY OF FERRITIN: CPT

## 2018-03-22 PROCEDURE — 85025 COMPLETE CBC W/AUTO DIFF WBC: CPT

## 2018-03-22 PROCEDURE — 84466 ASSAY OF TRANSFERRIN: CPT

## 2018-03-22 PROCEDURE — 83605 ASSAY OF LACTIC ACID: CPT

## 2018-03-22 PROCEDURE — 84484 ASSAY OF TROPONIN QUANT: CPT

## 2018-03-22 PROCEDURE — 99284 EMERGENCY DEPT VISIT MOD MDM: CPT

## 2018-03-22 PROCEDURE — 90937 HEMODIALYSIS REPEATED EVAL: CPT

## 2018-03-22 PROCEDURE — 87086 URINE CULTURE/COLONY COUNT: CPT

## 2018-03-22 PROCEDURE — 36415 COLL VENOUS BLD VENIPUNCTURE: CPT

## 2018-03-22 PROCEDURE — 80053 COMPREHEN METABOLIC PANEL: CPT

## 2018-03-22 PROCEDURE — 85610 PROTHROMBIN TIME: CPT

## 2018-03-22 PROCEDURE — 87040 BLOOD CULTURE FOR BACTERIA: CPT

## 2018-03-22 PROCEDURE — 81001 URINALYSIS AUTO W/SCOPE: CPT

## 2018-03-22 PROCEDURE — 82550 ASSAY OF CK (CPK): CPT

## 2018-03-22 RX ADMIN — LABETALOL HCL SCH MG: 100 TABLET, FILM COATED ORAL at 20:31

## 2018-03-22 RX ADMIN — SEVELAMER CARBONATE SCH MG: 800 TABLET, FILM COATED ORAL at 17:21

## 2018-03-22 RX ADMIN — LABETALOL HCL SCH MG: 100 TABLET, FILM COATED ORAL at 17:21

## 2018-03-22 RX ADMIN — HEPARIN SODIUM SCH UNIT: 5000 INJECTION INTRAVENOUS; SUBCUTANEOUS at 20:32

## 2018-03-22 RX ADMIN — SODIUM CHLORIDE SCH GM: 9 INJECTION, SOLUTION INTRAVENOUS at 20:31

## 2018-03-22 RX ADMIN — Medication SCH MG: at 20:31

## 2018-03-22 NOTE — DIAGNOSTIC IMAGING REPORT
EXAM: CHEST 2 VIEWS, PA and lateral

INDICATION: Shortness of breath, pneumonia

COMPARISON: AP view of the chest March 13, 2018



FINDINGS:

LINES/TUBES: None



LUNGS: Bilateral interstitial and alveolar opacities. 



PLEURA: No effusions or pneumothorax.



HEART AND MEDIASTINUM: Stable cardiomegaly and vascular congestion.



BONES AND SOFT TISSUES: No acute findings. 



IMPRESSION:

Findings most likely represent pulmonary edema. Atypical/viral infection is in

the differential.







Signed by: Dr. Vera Parish M.D. on 3/22/2018 6:58 AM

## 2018-03-22 NOTE — XMS REPORT
Continuity of Care Document

 Created on: 2018



BORIS LANDAVERDE

External Reference #: [id number]

: 1980

Sex: Male



Demographics







 Address  [address] [place]  [address]

 

 Home Phone  [phone]

 

 Preferred Language  Unknown

 

 Marital Status  Unknown

 

 Mormonism Affiliation  Unknown

 

 Race  Other

 

 Ethnic Group  Unknown





Author







 Author  [facility]

 

 Organization  [facility]

 

 Address  [address] [place]  [address]



 

 Phone  Unavailable







Support







 Name  Relationship  Address  Phone

 

 NAKUL SARMIENTO MD  Caregiver  [address] [place]  [address]  [phone]

 

 NAKUL SARMIENTO MD  Caregiver  [address] [place]  [address]  [phone]

 

 NOREEN FRIED MD  Caregiver  [address] [place] [address]  [address]  [phone]

 

 NOREEN FRIED MD  Caregiver  [address] [place] [address]  [address]  [phone]

 

 BRIELLE WEEKS MD  Caregiver  [address] [place] [address]  [address]  Unavailable

 

 DARIN LANDEROS  Next Of Kin  [address] [place] [address]  [phone]







Care Team Providers







 Care Team Member Name  Role  Phone

 

 NAKUL SARMIENTO MD  PCP  [phone]







Insurance Providers







 Guarantor  Boris Landaverde

 

 Address  [address] [place] [address]

 

 Phone  [phone]

 

 Email  [email or web address]











 Payer  [facility]

 

 Policy Number  [id number]

 

 Subscriber's Name  Boris Landaverde

 

 Relationship  18 Self / Same As Patient

 

 Group Name  [employer]AZUO

 

 Effective Date  18











 Payer  [employer]

 

 Policy Number  [id number]

 

 Subscriber's Name  Darin Landeros

 

 Relationship  01 Wife

 

 Group Number  [id number]

 

 Group Name  [employer]

 

 Effective Date  11







Advance Directives







 Directive  Response  Recorded Date/Time

 

 Does the patient have an advance directive?  No  18 8:30am

 

 If yes, is advance directive on file with [facility]?  No  18 8:30am

 

 If not on file with [facility] will patient provide a copy?  Yes  18 8:30am

 

 Do you have a Directive to Physician?  No  18 4:10am

 

 Do you have a Medical Power of ?  No  18 4:10am

 

 Do you have an out of hospital Do Not Resuscitate Order?  No  18 4:10am

 

 Do you have any special needs we should be aware of?  No  18 4:10am

 

 Do you have a support person here with you today?  Yes  18 4:10am

 

 Did patient receive Notice of Privacy Practices?  Yes  18 4:10am

 

 Did patient receive patient rights and responsibilities?  Yes  18 4:10am







Problems







 Medical Problem  Onset Date  Status

 

 Bronchitis  Unknown  Acute

 

 ESRD (end stage renal disease) on dialysis  Unknown   

 

 Fever  Unknown  Acute

 

 Influenza  Unknown  Acute

 

 Pneumonia  Unknown   

 

 Volume overload  Unknown   

 

 Vomiting  Unknown   

 

 Vomiting  Unknown   







Medications





Current Home Medications





 Medication  Dose  Units  Route  Directions  Days  Qty  Instructions  Start Date

 

 Azithromycin (Z-Ty) 250 Mg Tablet  250  Mg  Oral  Daily     1 Udpkt  Z-Pack   

 

 Nifedipine (Procardia Xl) 30 Mg Tab.er.24  30  Mg  Oral  Daily     30 Tab      

 

 Sevelamer Carbonate (Renvela) 0.8 Gm Powd.pack  3,200  Mg  Oral  Three Times 
Daily With Meals            









Past Home Medications





 Medication  Directions  Ordered  Status

 

 Cyclosporine, Modified (Neoral) 100 Mg Capsule, 125 Mg Oral  Twice A Day     
Discontinued

 

 Hydralazine Hcl 25 Mg Tab, 50 Mg Oral  Twice A Day     Discontinued

 

 Metoprolol Tartrate (Lopressor) 25 Mg Tab, 100 Mg Oral  Twice A Day     
Discontinued

 

 Prednisone 10 Mg Tab, 10 Mg Oral  Daily     Discontinued

 

 Prednisone 20 Mg Tablet,  Three Tabs Daily     Discontinued

 

 Sevelamer Carbonate (Renvela) 0.8 Gm Powd.pack, 800 Mg Oral  Four Times Daily 
    Discontinued







Social History







 Social History Problem  Response  Recorded Date/Time  Onset Date  Status

 

 Hx Psychiatric Problems  No  2018 8:30am  Not Applicable  Not Applicable

 

 Hx Eating Disorder  No  2018 8:30am  Not Applicable  Not Applicable

 

 Hx Substance Use Disorder  No  2018 8:30am  Not Applicable  Not 
Applicable

 

 Hx Depression  No  2018 8:30am  Not Applicable  Not Applicable

 

 Hx Alcohol Use  No  2018 8:30am  Not Applicable  Not Applicable

 

 Hx Substance Use Treatment  No  2018 8:30am  Not Applicable  Not 
Applicable

 

 Hx Physical Abuse  No  2018 8:30am  Not Applicable  Not Applicable











 Smoking Status  Start Date  Stop Date

 

 Never Smoker      







Hospital Discharge Instructions

No hospital discharge instruction information available.



Plan of Care







 Discharge Date  18 2:57pm

 

 Disposition  HOME, SELF-CARE

 

 Instructions/Education Provided  Pneumonia - Bacterial

 

 Prescriptions  See Medication Section

 

 Additional Instructions/Education  ACTIVITY AS TOLERATED

RENAL DIET AS TOLERATED

FOLLOW UP WITH YOUR PRIMARY CARE PHYSICIAN IN 1-2 WEEKS.







Functional Status







 Query  Response  Date Recorded

 

 Assistive Devices  None

  2018 10:51am

 

 Ambulation Ability  Independent

  2018 10:51am

 

 Toileting Ability  Independent

  2018 12:47pm







Allergies, Adverse Reactions, Alerts

No known allergies.



Immunizations

No immunization information available.



Vital Signs





Acute Vital Signs





 Vital  Response  Date/Time

 

 Temperature (Fahrenheit)  96.5 degrees F (97.6 - 99.5)  2018 12:46pm

 

 Pulse      

 

    Pulse Rate (adult)  85 bpm (60 - 90)  2018 12:46pm

 

 Respiratory Rate  18 bpm (12 - 24)  2018 12:46pm

 

 Blood Pressure  151/98 mm Hg  2018 12:46pm

 

 Height  5 ft 7 in  2018 6:14am

 

 Weight  198.38 lb  2018 6:14am

 

 Body Mass Index  31.1 kg/m^2  2018 8:30am







Results





Laboratory Results





 Test Name  Result  Units  Flags  Reference  Collection Date/Time  Result Date/
Time  Comments

 

 Bedside Glucose  98  mg/dL       10/27/2017 7:36am  10/27/2017 8:03am  
Meter ID: [id number]



 

 Lipase  26  U/L     8-78  10/22/2017 9:40pm  10/22/2017 10:12pm   

 

 Hepatitis A IgM Antibody  Negative           10/23/2017 2:43pm  10/26/2017 11:
45am   

 

 Hepatitis B Core IgM Antibody  Negative           10/23/2017 2:43pm  10/26/
2017 11:45am   

 

 Hepatitis C Antibody  <0.1           10/23/2017 2:43pm  10/26/2017 11:45am  
Reference Range: 0.0 - 0.9 s/co ratio



Negative: < 0.8



Indeterminate: 0.8 - 0.9



Positive: > 0.9



The CDC recommends that a positive HCV antibody result



be followed up with a HCV Nucleic Acid Amplification



test (304847).







Testing performed by:



[address] [address]



 [phone]



Dir: Vinicio Peña MD

 

 Group A Streptococcus Screen  NEGATIVE        NEGATIVE  2018 12:12pm   12:50pm   

 

 White Blood Count  7.26  x10e3/uL     4.8-10.8  2018 6:30am  2018 7
:48am   

 

 Red Blood Count  2.96  x10e6/uL   L  4.3-5.7  2018 6:302018 7:
48am   

 

 Hemoglobin  8.9  g/dL   L  14.0-18.0  2018 6:302018 7:48am   

 

 Hematocrit  27.0  %   L  38.2-49.6  2018 6:302018 7:48am   

 

 Mean Corpuscular Volume  91.2  fL     81-99  2018 6:302018 7:
48am   

 

 Mean Corpuscular Hemoglobin  30.1  pg     28-32  2018 6:302018 
7:48am   

 

 Mean Corpuscular Hemoglobin Concent  33.0  g/dL     31-35  2018 6:302018 7:48am   

 

 Red Cell Distribution Width  13.3  %     11.7-14.4  2018 6:302018 7:48am   

 

 Platelet Count  98  x10e3/uL   L  140-360  2018 6:302018 7:
48am   

 

 Neutrophils (%) (Auto)  70.8  %     38.7-80.0  2018 6:302018 7:
48am   

 

 Lymphocytes (%) (Auto)  11.0  %   L  18.0-39.1  2018 6:302018 7
:48am   

 

 Monocytes (%) (Auto)  12.5   %   H  4.4-11.3  2018 6:302018 7:
48am   

 

 Eosinophils (%) (Auto)  5.0  %     0.0-6.0  2018 6:302018 7:
48am   

 

 Basophils (%) (Auto)  0.4  %     0.0-1.0  2018 6:302018 7:48am
   

 

 IM GRANULOCYTES %  0.3  %     0.0-1.0  2018 6:302018 7:48am   

 

 Neutrophils # (Auto)  5.1        2.1-6.9  2018 6:30am  2018 7:48am
   

 

 Lymphocytes # (Auto)  0.8      L  1.0-3.2  2018 6:302018 7:
48am   

 

 Monocytes # (Auto)  0.9      H  0.2-0.8  2018 6:302018 7:48am 
  

 

 Eosinophils # (Auto)  0.4        0.0-0.4  2018 6:302018 7:48am
   

 

 Basophils # (Auto)  0.0        0.0-0.1  2018 6:302018 7:48am   

 

 Absolute Immature Granulocyte (auto  0.02  x10e3/uL     0-0.1  2018 6:
302018 7:48am   

 

 Prothrombin Time  14.6  seconds   H  11.9-14.5  2018 3:35am  2018 3
:59am   

 

 Prothromb Time International Ratio  1.23           2018 3:35am  2018 3:59am  Oral Anticoagulant Therapy INR Values:



 1. Low Intensity Therapy        1.5 - 2.0



 2. Moderate Intensity Therapy   2.0 - 3.0



 3. High Intensity Therapy(1)    2.5 - 3.5



 4. High Intensity Therapy(2)    3.0 - 4.0



 5. Panic Value INR              > 5.0

 

 Activated Partial Thromboplast Time  36.4  seconds   H  23.8-35.5  2018 3
:35am  2018 3:59am   

 

 Urine Color  YELLOW        YELLOW  2018 3:35am  2018 3:49am   

 

 Urine Clarity  CLEAR        CLEAR  2018 3:35am  2018 3:49am   

 

 Urine Specific Gravity  1.015        1.010-1.025  2018 3:35am  2018 3:49am   

 

 Urine pH  9      H  5 - 7  2018 3:35am  2018 3:49am   

 

 Urine Leukocyte Esterase  NEGATIVE        NEGATIVE  2018 3:35am  2018 3:49am   

 

 Urine Nitrite  NEGATIVE        NEGATIVE  2018 3:35am  2018 3:49am 
  

 

 Urine Protein  3+      H  NEGATIVE  2018 3:35am  2018 3:49am   

 

 Urine Glucose (UA)  1+      H  NEGATIVE  2018 3:35am  2018 3:49am 
  

 

 Urine Ketones  NEGATIVE        NEGATIVE  2018 3:35am  2018 3:49am 
  

 

 Urine Urobilinogen  0.2  mg/dL     0.2 - 1  2018 3:35am  2018 3:
49am   

 

 Urine Bilirubin  NEGATIVE        NEGATIVE  2018 3:35am  2018 3:
49am   

 

 Urine Blood  2+      H  NEGATIVE  2018 3:35am  2018 3:49am   

 

 Urine WBC  0-5  /HPF     0-5  2018 3:35am  2018 3:58am   

 

 Urine RBC  21-50  /HPF   H  0-5  2018 3:35am  2018 3:58am   

 

 Urine Bacteria  NONE  /HPF     NONE  2018 3:35am  2018 3:58am   

 

 Urine Epithelial Cells  FEW  /LPF     NONE  2018 3:35am  2018 3:
58am   

 

 Sodium Level  135  mmol/L   L  136-145  2018 6:30am  2018 7:10am   

 

 Potassium Level  4.2  mmol/L     3.5-5.1  2018 6:30am  2018 7:10am
   

 

 Chloride Level  97  mmol/L   L    2018 6:30am  2018 7:10am   

 

 Influenza Virus Types A,B Antigen  NEGATIVE        NEGATIVE  2018 3:35am
  2018 4:04am   

 

 Carbon Dioxide Level  28  mmol/L     22-29  2018 6:30am  2018 7:
10am   

 

 Anion Gap  14.2  mmol/L     8-16  2018 6:30am  2018 7:10am   

 

 Blood Urea Nitrogen  29  mg/dL   H  7-26  2018 6:302018 7:10am
   

 

 Creatinine  8.65  mg/dL   H  0.72-1.25  2018 6:30am  2018 7:10am   

 

 BUN/Creatinine Ratio  3      L  6-25  2018 6:30am  2018 7:10am   

 

 Estimat Glomerular Filtration Rate  7  ML/MIN   L  60-  2018 6:30 7:10am  Ranges were taken from the National Kidney Disease Education 

Program and the National Kidney Foundation literature.







Reference ranges:



 60 or greater: Normal



 16-59 (for 3 consecutive months): Chronic kidney disease 



 15 or less: Kidney failure

 

 Glucose Level  98  mg/dL       2018 6:30am  2018 7:10am   

 

 Calcium Level  7.3  mg/dL   L  8.4-10.2  2018 6:302018 7:10am 
  

 

 Lactic Acid Level  7.4  MG/DL     4.5-19.8  2018 3:35am  2018 4:
00am   

 

 Magnesium Level  1.9  MG/DL     1.3-2.1  2018 3:35am  2018 4:05am 
  

 

 Total Bilirubin  0.8  mg/dL     0.2-1.2  2018 6:302018 7:10am 
  

 

 Aspartate Amino Transf (AST/SGOT)  12  IU/L     5-34  2018 6:302018 7:10am   

 

 Alanine Aminotransferase (ALT/SGPT)  9  IU/L     0-55  2018 6:30am   7:10am   

 

 Total Protein  6.4  g/dL   L  6.5-8.1  2018 6:30am  2018 7:10am   

 

 Albumin  2.6  g/dL   L  3.5-5.0  2018 6:30am  2018 7:10am   

 

 Globulin  3.8  g/dL   H  2.3-3.5  2018 6:30am  2018 7:10am   

 

 Albumin/Globulin Ratio  0.7      L  0.8-2.0  2018 6:30am  2018 7:
10am   

 

 Alkaline Phosphatase  74  IU/L       2018 6:30am  2018 7:
10am   

 

 B-Type Natriuretic Peptide  1786.5  pg/mL   H  0-100  2018 3:35am  2018 4:16am   

 

 Creatine Kinase  54  IU/L       2018 6:45pm  2018 7:33pm   

 

 Creatine Kinase MB  0.30  ng/mL     0-5.0  2018 6:45pm  2018 7:
40pm   

 

 Troponin I  0.027  ng/mL     0-0.300  2018 6:45pm  2018 7:40pm   

 

 Hepatitis B Surface Antigen  Negative        Negative  2018 5:45pm   9:07am  Performed at:  [facility]  [id number]



: Vinicio Peña MD, Phone:  [phone]











Microbiology Results





 Procedure  Source  Organism/Result  Collection Date/Time  Result Date/Time  
Result Status

 

 Blood Culture  Blood                               NO GROWTH AFTER 48 HOURS   3:35am  2018 3:41am  Preliminary

 

 Blood Culture  Blood   ENTEROCOCCUS SPECIES  2018 3:35am  2018 8:
20am  Preliminary







Procedures







 Procedure  Status  Date  Provider(s)

 

 PRP I/LANA INIT REDUC >5 YR  Completed  17  VIVIANA LANDEROS MD

 

 PERFORMANCE OF URINARY FILTRATION, <6 HRS/DAY  Completed  10/23/17  ANTWAN ONTIVEROS

 

 X-ray of chest, two views  Active  17  VIVIANA LANDEROS MD

 

 X-ray of chest, two views  Active  10/22/17  BRIELLE WEEKS MD

 

 Computed tomography of chest with contrast  Active  10/23/17  BEENA ANDREWS MD

 

 X-ray of chest, two views  Active  17  NAKUL SARMIENTO MD

 

 X-ray of chest, two views  Active  18  CLARIBEL GARCIA NP

 

 X-ray of chest, two views  Active  18  BRIELLE WEEKS MD

 

 Computed tomography of brain without radiopaque contrast  Active  18  
BRIELLE WEEKS MD







Encounters







 Encounter  Location  Arrival/Admit Date  Discharge/Depart Date  Attending 
Provider

 

 Discharged Inpatient  [facility]  18 5:25am  18 
2:57pm  NOREEN FRIED MD

 

 Departed Emergency Room  [facility]  18 11:44am   2:11pm  YENIFER COTTRELL MD

 

 Registered Clinic  [facility]  17 10:23am     NAKUL SARMIENTO MD

 

 Discharged Inpatient  [facility]  10/22/17 11:05pm  10/30/
17 5:58pm  NOREEN FRIED MD

 

 Registered Surgical Day Care  [facility]  17 6:13am  
   VIVIANA LANDEROS MD

 

 Departed Emergency Room  [facility]  17 10:38pm   3:57am  BRIELLE WEEKS MD

## 2018-03-22 NOTE — CONSULTATION
DATE OF CONSULTATION:  March 22, 2018 



Mr. Simba Landaverde is well known to me.  He is a  gentleman with 

underlying history of end-stage renal disease, hypertension, recently 

admitted with pneumonia, fluid overload and was discharged.  Presented with 

shortness of breath.  Has some cough, mostly dry.  Had some chills 

yesterday.  No fever.  No nausea.  No vomiting.  Workup here included chest 

x-ray, which shows evidence of possible pulmonary edema.  Atypical 

infection needs to be ruled out.  He is currently awake, alert and 

complaining of headache, but other than that no new complaints.  



ALLERGIES:  NO APPARENT DRUG ALLERGIES.



PHYSICAL EXAMINATION

VITALS:  Blood pressure 151/_______, pulse rate 97 and afebrile.  

HEENT:  Cornea clear.  Oral mucosa dry. 

LUNGS:  Bibasilar rales. 

HEART:  S1 and S2 audible. 

ABDOMEN:  Otherwise soft and nontender. 

LOWER EXTREMITY EXAMINATION:  Shows no edema.  



PAST HISTORY:  History of hypertension, secondary hyperparathyroidism, 

anemia of chronic kidney disease.  



Laboratory test shows white count of 7.22, hemoglobin 8.4.  Potassium 4.2 

and creatinine 8.2. 



IMPRESSION

1.  Underlying congestive heart failure.  

2.  Hypertension.  

3.  Headaches likely due to hypertension as well:  Headache is mostly in 

the occipital area.

4.  Underlying pneumonia cannot be ruled out:  Will start empiric 

antibiotics.  



Will give him a shot of Toradol for headache.  In the meantime, start 

Procardia XL 60 mg twice a day.  Will give clonidine 1 time dose now.  

Arrange for dialysis.  Please see orders.  



 





DD:  03/22/2018 14:57

DT:  03/22/2018 15:30

Job#:  [id number]

## 2018-03-22 NOTE — HISTORY AND PHYSICAL
PRIMARY CARE PHYSICIAN:  Dr. Tran.



CHIEF COMPLAINT:  Shortness of breath.



HISTORY OF PRESENT ILLNESS:  This is a 38-year-old man with a history of 

end-stage renal disease and for the past 4 years on dialysis, now 

developing shortness of breath. He dialyzed _______ Saturday. He developed 

shortness of breath overnight. Therefore, he came to the hospital. He was 

noted to have cough but no fever. Here he was found to have 

bronchopneumonia and pulmonary edema. He is admitted for further evaluation 

and management. 



PAST MEDICAL HISTORY:  End-stage renal disease on hemodialysis for the past 

4 years, hypertension, pneumonia, congestive heart failure type unknown, 

pleural effusion, mild mitral regurgitation and trace tricuspid 

regurgitation.



PAST SURGICAL HISTORY:  Inguinal hernia repair.



ALLERGIES:  PER THE ELECTRONIC MEDICAL RECORDS.



FAMILY HISTORY/SOCIAL HISTORY:  Patient is . He has 1 child. No 

alcohol, illicit drugs or cigarettes.



MEDICATIONS:  Per the electronic medical records. Medications reviewed.



REVIEW OF SYSTEMS:  Denies any dizziness, chest pain.



VITAL SIGNS:  Reviewed. 



PHYSICAL EXAMINATION

GENERAL APPEARANCE:  A tired-appearing man resting in bed.

HEENT:  Anicteric.

CARDIOVASCULAR:  Normal S1/S2. 

LUNGS:  He has reduced breath sounds throughout.

ABDOMEN:  Soft, nontender, nondistended. 

EXTREMITIES:  He has left arm bruit.

SKIN:  Dry.

PSYCHIATRIC:  Flat affect.



LABS:  Reviewed.



ASSESSMENT AND PLAN:  This is a 38-year-old man.

1. Pulmonary edema. Will remove fluid by dialysis.

2. End-stage renal disease on hemodialysis. Nephrology consulted.

3. Hypertensive urgency. Continue calcium channel blocker and add beta 

blocker.

4. Bronchopneumonia. Continue antibiotics. _______.

5. Likely diastolic congestive heart failure. Will obtain a BNP and 

monitor fluid status.

6. Normocytic anemia, moderate. Will obtain anemia panel.

7. Overweight state. BMI is 27.9. His glucose level is 97. No need to 

check his hemoglobin A1c.

8. Prophylaxis. Will use heparin and Pepcid.

9. Disposition. Monitor closely. Follow up labs in the morning.











DD:  03/22/2018 15:51

DT:  03/22/2018 15:55

Job#:  [id number]

## 2018-03-23 VITALS — SYSTOLIC BLOOD PRESSURE: 141 MMHG | DIASTOLIC BLOOD PRESSURE: 83 MMHG

## 2018-03-23 VITALS — DIASTOLIC BLOOD PRESSURE: 100 MMHG | SYSTOLIC BLOOD PRESSURE: 167 MMHG

## 2018-03-23 VITALS — SYSTOLIC BLOOD PRESSURE: 146 MMHG | DIASTOLIC BLOOD PRESSURE: 87 MMHG

## 2018-03-23 VITALS — SYSTOLIC BLOOD PRESSURE: 123 MMHG | DIASTOLIC BLOOD PRESSURE: 68 MMHG

## 2018-03-23 VITALS — SYSTOLIC BLOOD PRESSURE: 146 MMHG | DIASTOLIC BLOOD PRESSURE: 88 MMHG

## 2018-03-23 VITALS — SYSTOLIC BLOOD PRESSURE: 130 MMHG | DIASTOLIC BLOOD PRESSURE: 74 MMHG

## 2018-03-23 LAB
ALBUMIN SERPL-MCNC: 2.9 G/DL (ref 3.5–5)
ALBUMIN/GLOB SERPL: 0.8 {RATIO} (ref 0.8–2)
ALP SERPL-CCNC: 77 IU/L (ref 40–150)
ALT SERPL-CCNC: 15 IU/L (ref 0–55)
ANION GAP SERPL CALC-SCNC: 13 MMOL/L (ref 8–16)
BASOPHILS # BLD AUTO: 0 10*3/UL (ref 0–0.1)
BASOPHILS NFR BLD AUTO: 0.5 % (ref 0–1)
BUN SERPL-MCNC: 20 MG/DL (ref 7–26)
BUN/CREAT SERPL: 3 (ref 6–25)
CALCIUM SERPL-MCNC: 8.4 MG/DL (ref 8.4–10.2)
CHLORIDE SERPL-SCNC: 100 MMOL/L (ref 98–107)
CO2 SERPL-SCNC: 29 MMOL/L (ref 22–29)
DEPRECATED NEUTROPHILS # BLD AUTO: 3.2 10*3/UL (ref 2.1–6.9)
EGFRCR SERPLBLD CKD-EPI 2021: 11 ML/MIN (ref 60–?)
EOSINOPHIL # BLD AUTO: 0.5 10*3/UL (ref 0–0.4)
EOSINOPHIL NFR BLD AUTO: 9.4 % (ref 0–6)
ERYTHROCYTE [DISTWIDTH] IN CORD BLOOD: 14.3 % (ref 11.7–14.4)
GLOBULIN PLAS-MCNC: 3.6 G/DL (ref 2.3–3.5)
GLUCOSE SERPLBLD-MCNC: 88 MG/DL (ref 74–118)
HCT VFR BLD AUTO: 24 % (ref 38.2–49.6)
HGB BLD-MCNC: 8.1 G/DL (ref 14–18)
LYMPHOCYTES # BLD: 1.2 10*3/UL (ref 1–3.2)
LYMPHOCYTES NFR BLD AUTO: 22.2 % (ref 18–39.1)
MCH RBC QN AUTO: 29.8 PG (ref 28–32)
MCHC RBC AUTO-ENTMCNC: 33.8 G/DL (ref 31–35)
MCV RBC AUTO: 88.2 FL (ref 81–99)
MONOCYTES # BLD AUTO: 0.6 10*3/UL (ref 0.2–0.8)
MONOCYTES NFR BLD AUTO: 10.5 % (ref 4.4–11.3)
NEUTS SEG NFR BLD AUTO: 57.2 % (ref 38.7–80)
PLATELET # BLD AUTO: 88 X10E3/UL (ref 140–360)
POTASSIUM SERPL-SCNC: 4 MMOL/L (ref 3.5–5.1)
RBC # BLD AUTO: 2.72 X10E6/UL (ref 4.3–5.7)
SODIUM SERPL-SCNC: 138 MMOL/L (ref 136–145)

## 2018-03-23 RX ADMIN — SEVELAMER CARBONATE SCH MG: 800 TABLET, FILM COATED ORAL at 18:26

## 2018-03-23 RX ADMIN — Medication SCH MG: at 08:55

## 2018-03-23 RX ADMIN — HEPARIN SODIUM SCH UNIT: 5000 INJECTION INTRAVENOUS; SUBCUTANEOUS at 08:55

## 2018-03-23 RX ADMIN — SEVELAMER CARBONATE SCH MG: 800 TABLET, FILM COATED ORAL at 12:09

## 2018-03-23 RX ADMIN — SODIUM CHLORIDE SCH GM: 9 INJECTION, SOLUTION INTRAVENOUS at 08:54

## 2018-03-23 RX ADMIN — SEVELAMER CARBONATE SCH MG: 800 TABLET, FILM COATED ORAL at 08:54

## 2018-03-23 NOTE — PROGRESS NOTE
DATE:  March 23, 2018 



TIME:  7:15 a.m.



OVERNIGHT:  No shortness of breath.  Less cough. 



REVIEW OF SYSTEMS:  Denies any dizziness, chest pain.  No fever.  Not in 

distress.  



PHYSICAL EXAM

VITAL SIGNS:  Reviewed.

GENERAL:  A tired-appearing man resting in bed. 

HEENT:  Nonicteric. 

CARDIOVASCULAR:  Normal S1/S2.  

LUNGS:  Moderate breath sounds.  Reduced at base. 

ABDOMEN:  Soft, nontender, nondistended.  

EXTREMITIES:  Left arm bruit. 

SKIN:  Dry.

PSYCHIATRIC:  Flat affect. 



LABS:  Reviewed.



MEDICATIONS:  Reviewed.



ASSESSMENT:  A 38-year-old man. 

1. Pulmonary edema/diastolic congestive heart failure. 

2. Hypertensive urgency. 

3. End-stage renal disease, on hemodialysis. 

4. Bronchopneumonia. 

5. Normocytic anemia, moderate. 

6. Overweight state, body mass index 27.9. 





PLAN 

1. Wean oxygen off. 

2. Continue antibiotics. 

3. Remove nitro patch _______. 

4. Titrate beta-blockers up and continue channel blocker.  

5. Monitor blood pressure.  If stable this afternoon, patient is able to 

do well room air, possible discharge later today.  

6. Follow up labs. 









DD:  03/23/2018 07:16

DT:  03/23/2018 07:18

Job#:  [id number]

## 2018-04-15 NOTE — DISCHARGE SUMMARY
PRINCIPAL DIAGNOSES 

1.  Pulmonary edema.

2.  Diastolic congestive heart failure exacerbation.

3.  Hypertensive urgency.

4.  End-stage renal disease, on hemodialysis.

5.  Bronchopneumonia.

6.  Normocytic anemia, moderate.

7.  Overweight state, body mass index 27.9.



SECONDARY DIAGNOSIS:  End-stage renal disease.



CHIEF COMPLAINT:  Shortness of breath.



HISTORY OF PRESENT ILLNESS:  A 38-year-old man developing shortness of 

breath.  Refer to the H and P for further details.



HOSPITAL COURSE:  The patient was found to have bronchopneumonia, pulmonary 

edema and diastolic congestive heart failure exacerbation.  Received 

medical therapy.  He had hypertensive urgency.  Medications were titrated.  

End-stage renal disease, on hemodialysis, and received hemodialysis.  

Normocytic anemia was monitored.  The patient's blood pressure medications 

and calcium channel blocker were managed.  The patient was treated with 

therapy for bronchopneumonia and improved, and subsequently discharged 

home.



DISCHARGE MEDICATIONS:  Per electronic medical record.



FOLLOWUP:  Primary care doctor in 1 week.



CONDITION ON DISCHARGE:  Stable and improving.





DISCHARGE LOCATION:  Home. 



 



 _________________________________

HOWIE SPRAGUE MD



DD:  04/15/2018 09:46

DT:  04/15/2018 09:48

Job#:  [id number]

## 2018-06-18 ENCOUNTER — HOSPITAL ENCOUNTER (OUTPATIENT)
Dept: HOSPITAL 88 - US | Age: 38
Discharge: HOME | End: 2018-06-18
Attending: INTERNAL MEDICINE
Payer: COMMERCIAL

## 2018-06-18 DIAGNOSIS — N18.6: Primary | ICD-10-CM

## 2018-06-18 DIAGNOSIS — R39.15: ICD-10-CM

## 2018-06-18 PROCEDURE — 76770 US EXAM ABDO BACK WALL COMP: CPT

## 2018-06-18 PROCEDURE — 76857 US EXAM PELVIC LIMITED: CPT

## 2018-06-18 NOTE — DIAGNOSTIC IMAGING REPORT
PROCEDURE:Ultrasound kidney and bladder

COMPARISON:None.

INDICATIONS:Urinary urgency

TECHNIQUE: Gray scale and color Doppler ultrasound kidneys with 

pre-and post void urinary bladder ultrasound.

 

FINDINGS:

Right: 9.4 x 2.9 x 3.4 cm. Cortical thickness 0.6 cm.

Left: 8.1 x 4.2 x 3 cm. Cortical thickness 1 cm.

 

Both kidneys demonstrate increased ependymal echogenicity. No 

conspicuous mass, stone or cyst. No hydronephrosis.

 

Prevoid bladder volume: 5 mL

Post void bladder volume: 1 mL

 

Survey views of the urinary bladder are limited by decompression. 

Bladder wall thickness 0.6 cm. There is insufficient bladder volume to 

evaluate for ureteral patency.

 

CONCLUSION:

1. The kidneys are atrophic in keeping with chronic medical renal 

disease.

 

2. limited evaluation of the bladder. There is relative wall thickening 

likely secondary to decompression, with the differential to include 

cystitis in the appropriate context.

 

 

Dictated by:  Jose Manuel Caal M.D. on 6/18/2018 at 13:56     

Electronically approved by:  Jose Manuel Caal M.D. on 6/18/2018 at 

13:56

## 2018-06-18 NOTE — DIAGNOSTIC IMAGING REPORT
PROCEDURE:URINARY BLADDER ULTRASOUND

COMPARISON:None.

INDICATIONS:Urgency

 

CONCLUSION:

Please refer to renal and urinary bladder ultrasound performed at the 

same date and time for full dictated report.

 

 

Dictated by:  Jose Manuel Caal M.D. on 6/18/2018 at 13:57     

Electronically approved by:  Jose Manuel Caal M.D. on 6/18/2018 at 

13:57

## 2018-10-02 ENCOUNTER — HOSPITAL ENCOUNTER (OUTPATIENT)
Dept: HOSPITAL 88 - RAD | Age: 38
End: 2018-10-02
Attending: INTERNAL MEDICINE
Payer: COMMERCIAL

## 2018-10-02 DIAGNOSIS — R10.13: Primary | ICD-10-CM

## 2018-10-02 PROCEDURE — 76700 US EXAM ABDOM COMPLETE: CPT

## 2018-10-02 NOTE — DIAGNOSTIC IMAGING REPORT
EXAM: Complete Abdominal Ultrasound

INDICATION:   Abdominal pain     

COMPARISON: None. 

TECHNIQUE: Transverse and longitudinal images of the upper abdomen were

obtained. 



FINDINGS:     

Liver:

Size: 16.6 cm in the right midclavicular line, normal

Appearance: Normal echogenicity, smooth contour

Mass: No focal masses



Spleen: 

Size: 13.7 cm in length, enlarged  

Echogenicity: Normal

Mass: No focal masses



Gallbladder:

Stones/Sludge: Mild sludge in the lumen of the gallbladder

Wall: 0.5 cm. Slightly thickened.

Appearance: No pericholecystic fluid or hydrops.  

Sonographic Murray's Sign: Negative



Bile Ducts:

Intrahepatic Ducts: No dilatation

Extrahepatic Ducts: Common bile duct measures 0.5 cm, no dilatation



Pancreas:

Visualized portions of the pancreatic head, neck and proximal body are normal.



Kidneys:

Length: 

Right 9.7 cm             

Left   9.2 cm

Echogenicity:  Increased

Collecting System:  No hydronephrosis

Stone:  None

Cyst/Mass: None



Vessels:

Aorta: Visualized portions are normal

Inferior Vena Cava: Visualized portions are normal

Main Portal Vein: 0.7 cm, normal size with hepatopetal flow.



Free Fluid:

Small amount of ascites.



IMPRESSION:

Small amount of sludge in the lumen of the gallbladder. The gallbladder wall is

thickened measuring 0.5 cm. No pericholecystic fluid is seen. The common bile

duct measures 0.5 cm.



Enlarged spleen measuring 13.7 cm in length.



Small amount of ascites.



Signed by: Dr. Jayesh Villafana M.D. on 10/2/2018 4:29 PM

## 2018-11-28 LAB
ANION GAP SERPL CALC-SCNC: 16.9 MMOL/L (ref 8–16)
BASOPHILS # BLD AUTO: 0 10*3/UL (ref 0–0.1)
BASOPHILS NFR BLD AUTO: 0.7 % (ref 0–1)
BUN SERPL-MCNC: 37 MG/DL (ref 7–26)
BUN/CREAT SERPL: 5 (ref 6–25)
CALCIUM SERPL-MCNC: 9.6 MG/DL (ref 8.4–10.2)
CHLORIDE SERPL-SCNC: 98 MMOL/L (ref 98–107)
CO2 SERPL-SCNC: 29 MMOL/L (ref 22–29)
DEPRECATED APTT PLAS QN: 36.1 SECONDS (ref 23.8–35.5)
DEPRECATED INR PLAS: 1.14
DEPRECATED NEUTROPHILS # BLD AUTO: 3.1 10*3/UL (ref 2.1–6.9)
EGFRCR SERPLBLD CKD-EPI 2021: 9 ML/MIN (ref 60–?)
EOSINOPHIL # BLD AUTO: 0.6 10*3/UL (ref 0–0.4)
EOSINOPHIL NFR BLD AUTO: 11.2 % (ref 0–6)
ERYTHROCYTE [DISTWIDTH] IN CORD BLOOD: 19.8 % (ref 11.7–14.4)
GLUCOSE SERPLBLD-MCNC: 75 MG/DL (ref 74–118)
HCT VFR BLD AUTO: 34.2 % (ref 38.2–49.6)
HGB BLD-MCNC: 10.5 G/DL (ref 14–18)
LYMPHOCYTES # BLD: 1.2 10*3/UL (ref 1–3.2)
LYMPHOCYTES NFR BLD AUTO: 20.7 % (ref 18–39.1)
MCH RBC QN AUTO: 26.6 PG (ref 28–32)
MCHC RBC AUTO-ENTMCNC: 30.7 G/DL (ref 31–35)
MCV RBC AUTO: 86.8 FL (ref 81–99)
MONOCYTES # BLD AUTO: 0.7 10*3/UL (ref 0.2–0.8)
MONOCYTES NFR BLD AUTO: 12.1 % (ref 4.4–11.3)
NEUTS SEG NFR BLD AUTO: 54.9 % (ref 38.7–80)
PLATELET # BLD AUTO: 96 X10E3/UL (ref 140–360)
POTASSIUM SERPL-SCNC: 4.9 MMOL/L (ref 3.5–5.1)
PROTHROMBIN TIME: 15.6 SECONDS (ref 11.9–14.5)
RBC # BLD AUTO: 3.94 X10E6/UL (ref 4.3–5.7)
SODIUM SERPL-SCNC: 139 MMOL/L (ref 136–145)

## 2018-11-30 ENCOUNTER — HOSPITAL ENCOUNTER (OUTPATIENT)
Dept: HOSPITAL 88 - OR | Age: 38
Setting detail: OBSERVATION
LOS: 1 days | Discharge: HOME | End: 2018-12-01
Attending: SURGERY | Admitting: SURGERY
Payer: COMMERCIAL

## 2018-11-30 VITALS — DIASTOLIC BLOOD PRESSURE: 87 MMHG | SYSTOLIC BLOOD PRESSURE: 146 MMHG

## 2018-11-30 VITALS — SYSTOLIC BLOOD PRESSURE: 135 MMHG | DIASTOLIC BLOOD PRESSURE: 83 MMHG

## 2018-11-30 VITALS — BODY MASS INDEX: 24.91 KG/M2 | HEIGHT: 66 IN | WEIGHT: 155 LBS

## 2018-11-30 VITALS — SYSTOLIC BLOOD PRESSURE: 139 MMHG | DIASTOLIC BLOOD PRESSURE: 78 MMHG

## 2018-11-30 VITALS — DIASTOLIC BLOOD PRESSURE: 78 MMHG | SYSTOLIC BLOOD PRESSURE: 139 MMHG

## 2018-11-30 DIAGNOSIS — N18.3: ICD-10-CM

## 2018-11-30 DIAGNOSIS — K64.5: Primary | ICD-10-CM

## 2018-11-30 DIAGNOSIS — I12.9: ICD-10-CM

## 2018-11-30 PROCEDURE — 36415 COLL VENOUS BLD VENIPUNCTURE: CPT

## 2018-11-30 PROCEDURE — 85610 PROTHROMBIN TIME: CPT

## 2018-11-30 PROCEDURE — 88304 TISSUE EXAM BY PATHOLOGIST: CPT

## 2018-11-30 PROCEDURE — 84132 ASSAY OF SERUM POTASSIUM: CPT

## 2018-11-30 PROCEDURE — 80048 BASIC METABOLIC PNL TOTAL CA: CPT

## 2018-11-30 PROCEDURE — 85025 COMPLETE CBC W/AUTO DIFF WBC: CPT

## 2018-11-30 PROCEDURE — 93005 ELECTROCARDIOGRAM TRACING: CPT

## 2018-11-30 PROCEDURE — 46260 REMOVE IN/EX HEM GROUPS 2+: CPT

## 2018-11-30 PROCEDURE — 85730 THROMBOPLASTIN TIME PARTIAL: CPT

## 2018-11-30 RX ADMIN — LABETALOL HCL SCH MG: 200 TABLET, FILM COATED ORAL at 20:18

## 2018-11-30 RX ADMIN — HYDROMORPHONE HYDROCHLORIDE PRN MG: 2 INJECTION INTRAMUSCULAR; INTRAVENOUS; SUBCUTANEOUS at 16:50

## 2018-11-30 RX ADMIN — HYDROMORPHONE HYDROCHLORIDE ONE MG: 2 INJECTION INTRAMUSCULAR; INTRAVENOUS; SUBCUTANEOUS at 14:28

## 2018-11-30 RX ADMIN — SODIUM CHLORIDE ONE: 9 INJECTION, SOLUTION INTRAVENOUS at 15:17

## 2018-11-30 RX ADMIN — HYDROMORPHONE HYDROCHLORIDE PRN MG: 2 INJECTION INTRAMUSCULAR; INTRAVENOUS; SUBCUTANEOUS at 20:15

## 2018-11-30 RX ADMIN — Medication SCH MG: at 20:18

## 2018-11-30 RX ADMIN — FENTANYL CITRATE ONE MCG: 50 INJECTION INTRAMUSCULAR; INTRAVENOUS at 14:27

## 2018-11-30 RX ADMIN — Medication ONE MG: at 14:27

## 2018-11-30 RX ADMIN — HYDROCODONE BITARTRATE AND ACETAMINOPHEN PRN EA: 7.5; 325 TABLET ORAL at 16:50

## 2018-11-30 RX ADMIN — SEVELAMER CARBONATE SCH MG: 800 TABLET, FILM COATED ORAL at 12:00

## 2018-11-30 RX ADMIN — SEVELAMER CARBONATE SCH MG: 800 TABLET, FILM COATED ORAL at 17:10

## 2018-11-30 RX ADMIN — HYDROMORPHONE HYDROCHLORIDE ONE MG: 2 INJECTION INTRAMUSCULAR; INTRAVENOUS; SUBCUTANEOUS at 13:24

## 2018-11-30 RX ADMIN — Medication ONE MG: at 13:06

## 2018-11-30 RX ADMIN — Medication ONE MG: at 13:13

## 2018-11-30 RX ADMIN — FENTANYL CITRATE ONE MCG: 50 INJECTION INTRAMUSCULAR; INTRAVENOUS at 12:22

## 2018-11-30 RX ADMIN — SODIUM CHLORIDE PRN MG: 900 INJECTION INTRAVENOUS at 20:15

## 2018-11-30 RX ADMIN — SODIUM CHLORIDE ONE: 9 INJECTION, SOLUTION INTRAVENOUS at 14:27

## 2018-11-30 RX ADMIN — Medication ONE MG: at 13:00

## 2018-11-30 NOTE — XMS REPORT
Clinical Summary

                             Created on: 2018



Boris Puri

External Reference #: [id number]

: 1980

Sex: Male



Demographics







                          Address                   [address] [place]  [address]

 

                          Home Phone                [phone]

 

                          Preferred Language        English

 

                          Marital Status            Unknown

 

                          Lutheran Affiliation     Mandaeism

 

                          Race                      Unknown

 

                          Ethnic Group              /Latin





Author







                          Author                    [facility]

 

                          Organization              [facility]

 

                          Address                   Unknown

 

                          Phone                     Unavailable







Support







                Name            Relationship    Address         Phone

 

                    Mary Beth Landeros    [employer]                [address] [place]  [address]                [phone]







Care Team Providers







                    Care Team Member Name    Role                Phone

 

                    Amado Quesada MD    Unavailable         [phone]

 

                    Junior Tran MD    PCP                 [phone]







Allergies

No Known Allergies



Medications







                          End Date                  Status



              Medication     Sig          Dispensed     Refills      Start  



                                         Date  

 

                                                    Active



                     labetalol (NORMODYNE) 200     Take 200 mg         0   



                           MG tablet                 by mouth 2     



                                         (two) times     



                                         daily.     

 

                          10/30/2019                Active



              NIFEdipine (ADALAT CC) 90     Take 1 tablet     60 tablet     11           10/30/201  



                     MG 24 hr tablet     (90 mg total)       8  



                                         by mouth 2     



                                         (two) times     



                                         daily.     

 

                          10/30/2019                Active



              sevelamer (RENVELA) 800     Take 1 tablet     90 tablet     11           10/30/201  



                     mg tablet           (800 mg             8  



                                         total) by     



                                         mouth 3     



                                         (three) times     



                                         daily with     



                                         meals.     

 

                          10/30/2019                Active



              hydrALAZINE (APRESOLINE)     Take 1 tablet     90 tablet     11           10/30/201  



                     25 MG tablet        (25 mg total)       8  



                                         by mouth     



                                         every 8     



                                         (eight)     



                                         hours.     

 

                          10/31/2019                Active



              losartan (COZAAR) 50 MG     Take 1 tablet     30 tablet     11           10/31/201  



                     tablet              (50 mg total)       8  



                                         by mouth     



                                         daily.     

 

                          2019                Active



              hydrocortisone 1 % cream     Apply        30 g         0              



                           topically 2               8  



                                         (two) times     



                                         daily.     

 

                          10/17/2018                Discontinued



                     CYCLOSPORINE MODIFIED     Take 75 mg by       0   



                           ORAL                      mouth 2 (two)     



                                         times daily.     

 

                          10/17/2018                Discontinued



                     carvedilol (COREG) 25 MG     Take 25 mg by       0   



                           tablet                    mouth 2 (two)     



                                         times daily     



                                         with     



                                         breakfast and     



                                         dinner.     

 

                          10/17/2018                Discontinued



                     doxazosin (CARDURA) 4 MG     Take 8 mg by        0   



                           tablet                    mouth nightly     



                                         .     

 

                          10/17/2018                Discontinued



                     predniSONE (DELTASONE) 20     Take 20 mg by       0   



                           MG tablet                 mouth daily     



                                         1/2 tab     



                                         daily.     

 

                          10/17/2018                Discontinued



                     sodium bicarbonate 648 MG     Take 1 tablet       0   



                           tablet                    by mouth 3     



                                         (three) times     



                                         daily.     

 

                          10/17/2018                Discontinued



                     cloNIDine (CATAPRES) 0.2     Take 0.2 mg         0   



                           MG tablet                 by mouth as     



                                         needed.     

 

                          10/17/2018                Discontinued



                     cholecalciferol, vitamin     Take 1,000          0   



                           D3, 1,000 unit            Units by     



                           capsuleIndications: ESRD     mouth daily.     



                                         (end stage renal disease)      



                                         ([facility]), Pre-transplant      



                                         evaluation for chronic      



                                         kidney disease      

 

                          10/30/2018                Discontinued



                     NIFEdipine (ADALAT CC) 60     Take 60 mg by       0   



                           MG 24 hr                  mouth daily.     



                                         tabletIndications: ESRD      



                                         (end stage renal disease)      



                                         ([facility]), Pre-transplant      



                                         evaluation for chronic      



                                         kidney disease      

 

                          10/30/2018                Discontinued



                     sevelamer (RENVELA) 800     Take 3,200 mg       0   



                           mg tabletIndications:     by mouth 2     



                           ESRD (end stage renal     (two) times     



                           disease) ([facility])            daily .     

 

                          2018                



              baclofen (LIORESAL) 10 MG     Take 1 tablet     60 tablet     0            10/30/201  



                     tablet              (10 mg total)       8  



                                         by mouth 3     



                                         (three) times     



                                         daily as     



                                         needed (prn     



                                         hiccup) for     



                                         up to 30     



                                         days.     

 

                          2018                



              acetaminophen-codeine     Take 1 tablet     20 tablet     0              



                     (TYLENOL #3) 300-30 mg     by mouth            8  



                           per tablet                every 6 (six)     



                                         hours as     



                                         needed for up     



                                         to 10 days.     



                                         Max Daily     



                                         Amount: 4     



                                         tablets     







Active Problems







                                        Patient Care Coordination Note

 

                                        



JESSICA Sommers  

Tel # [phone]









 



                           Problem                   Noted Date

 

 



                           Thrombocytopenia          10/29/2018

 

 



                           Colitis                   10/23/2018

 

 



                           ESRD (end stage renal disease)     2014

 

 



                                         ESRD on hemodialysis 







Encounters







                          Care Team                 Description



                     Date                Type                Specialty  

 

                                        



Eldon Alvarez MD             Right leg pain (Primary Dx); 

Acute pain of right knee; 

Right leg swelling; 

ESRD on dialysis ([facility])



                     2018          Emergency           Emergency Medicine  

 

                                                     



                           2018                Travel   

 

                                        



Anant Garza MD                    Rectal bleeding (Primary Dx); 

External hemorrhoid, bleeding; 

Bright red blood per rectum; 

ESRD (end stage renal disease) ([facility]); 

Anemia, unspecified type



                     2018          Emergency           Emergency Medicine  

 

                                                     



                           2018                Travel   

 

                                        



Alondra Cueto MD                      R & L CATH (+/- SATS & CARDIAC OUTPUT)



                           10/25/2018                Surgery   

 

                                        



Jose Manuel Pinzon MD Giveon, Ron, MD                         Colitis (Primary Dx); 

ESRD on dialysis ([facility]); 

Generalized abdominal pain; 

ESRD on hemodialysis ([facility])



                     10/23/2018          [facility]            General Internal Medicine  



                           -                         Encounter   



                                         10/30/2018    

 

                                        



Cristina Hernandes RN                        Waitlist Maintenance



                     10/23/2018          Telephone           Transplant  

 

                                        



Kendra Larios MD                  ESRD (end stage renal disease) ([facility])



                     10/19/2018          Hospital            Radiology  



                                         Encounter   

 

                                        



Kendra Larios MD                  ESRD (end stage renal disease) ([facility])



                     10/19/2018          Hospital            Cardiology  



                                         Encounter   

 

                                        



Kendra Larios MD                   



                           10/19/2018                Outside Orders   

 

                                        



Kailey Monahan MD Finch, Jennifer L., MD                  ESRD (end stage renal disease) ([facility]) (Primary Dx)



                     10/17/2018          Evaluation          Transplant  

 

                                        



Ross Ewing                           Appointment



                     10/02/2018          Telephone           Transplant  

 

                                        



Cristina Hernandes RN                        ESRD (end stage renal disease) ([facility]) (Primary Dx); 

Awaiting transplantation of kidney



                     2018          Documentation       Transplant  

 

                                        



Cristina Hernandes RN                        Patient awaiting renal transplant (Primary Dx)



                     2018          Orders Only         Transplant  



after 2017



Family History







   



                 Medical History     Relation        Name            Comments

 

   



                           Kidney disease            Brother  

 

   



                           Unremarkable              Brother  

 

   



                           Unremarkable              Brother  

 

   



                           Unremarkable              Daughter  

 

   



                           Unremarkable              Father  

 

   



                     Cancer              Mother              

 

   



                     Cancer              Sister              colon

 

   



                           Kidney disease            Sister  

 

   



                           Unremarkable              Sister  

 

   



                           Unremarkable              Sister  









   



                 Relation        Name            Status          Comments

 

   



                           Brother                   Alive 

 

   



                           Brother                   Alive 

 

   



                           Brother                   Alive 

 

   



                           Daughter                  Alive 

 

   



                           Father                     

 

   



                           Mother                     

 

   



                           Sister                    Alive 

 

   



                           Sister                    Alive 

 

   



                           Sister                    Alive 







Social History







                                        Date



                 Tobacco Use     Types           Packs/Day       Years Used 

 

                                         



                                         Former Smoker    

 

    



                           Smokeless Tobacco: Former       Quit: 2004



                                         User   









                                        Comments: Quit more than 20 yrs ago.









   



                 Alcohol Use     Drinks/Week     oz/Week         Comments

 

   



                                         No   









 



                           Sex Assigned at Birth     Date Recorded

 

 



                                         Not on file 









                                        Industry



                           Job Start Date            Occupation 

 

                                        Not on file



                           Not on file               Not on file 









                                        Travel End



                           Travel History            Travel Start 

 





                                         No recent travel history available.







Last Filed Vital Signs







                                        Time Taken



                           Vital Sign                Reading 

 

                                        2018  2:40 PM CST



                           Blood Pressure            135/78 

 

                                        2018  2:40 PM CST



                           Pulse                     82 

 

                                        2018  2:40 PM CST



                           Temperature               36.7 C (98.1 F) 

 

                                        2018  2:40 PM CST



                           Respiratory Rate          16 

 

                                        2018  1:47 PM CST



                           Oxygen Saturation         99% 

 

                                        -



                           Inhaled Oxygen            - 



                                         Concentration  

 

                                        2018 11:56 AM CST



                           Weight                    77.6 kg (171 lb) 

 

                                        2018 11:56 AM CST



                           Height                    167.6 cm (5' 6") 

 

                                        2018 11:56 AM CST



                           Body Mass Index           27.6 







Plan of Treatment







   



                 Health Maintenance     Due Date        Last Done       Comments

 

   



                           INFLUENZA VACCINE         10/01/2018  







Procedures







                                        Comments



                 Procedure Name     Priority        Date/Time       Associated Diagnosis 

 

                                        



Results for this procedure are in the results section.



                     VENOUS DOPPLER LEG, RIGHT     STAT                2018  



                                         1:38 PM CST  

 

                                        



Results for this procedure are in the results section.



                     CBC W/PLT COUNT & AUTO     STAT                2018  



                           DIFFERENTIAL              11:21 PM CST  

 

                                        



Results for this procedure are in the results section.



                     PT/APTT             STAT                2018  



                                         11:21 PM CST  

 

                                        



Results for this procedure are in the results section.



                     CBC W/PLT COUNT & AUTO     STAT                2018  



                           DIFFERENTIAL              11:21 PM CST  

 

                                        



Results for this procedure are in the results section.



                     BASIC METABOLIC PANEL (7)     STAT                2018  



                                         11:21 PM CST  

 

                                         



                           CARDIAC CATH REPORT -      10/31/2018  



                           SCAN                      2:40 PM CDT  

 

                                         



                           VASCULAR DIAGRAM -SCAN      10/31/2018  



                                         2:40 PM CDT  

 

                                         



                           RHYTHM STRIP - SCAN       10/31/2018  



                                         2:40 PM CDT  

 

                                         



                           ECHOCARDIOGRAM REPORT -      10/30/2018  



                           SCAN                      6:30 PM CDT  

 

                                        



Results for this procedure are in the results section.



                     TISSUE EXAM         AP Routine          10/30/2018  



                                         8:07 AM CDT  

 

                                        



Results for this procedure are in the results section.



                     CBC W/PLT COUNT & AUTO     Routine             10/30/2018  



                           DIFFERENTIAL              4:53 AM CDT  

 

                                        



Results for this procedure are in the results section.



                     PHOSPHORUS          Routine             10/30/2018  



                                         4:53 AM CDT  

 

                                        



Results for this procedure are in the results section.



                     MAGNESIUM           Routine             10/30/2018  



                                         4:53 AM CDT  

 

                                        



Results for this procedure are in the results section.



                     BASIC METABOLIC PANEL (7)     Routine             10/30/2018  



                                         4:53 AM CDT  

 

                                        



Results for this procedure are in the results section.



                     CBC W/PLT COUNT & AUTO     Routine             10/30/2018  



                           DIFFERENTIAL              4:53 AM CDT  

 

                                        



Results for this procedure are in the results section.



                     US CORE BIOPSY      Routine             10/29/2018  



                                         4:50 PM CDT  

 

                                        



Results for this procedure are in the results section.



                     CBC W/PLT COUNT & AUTO     Routine             10/29/2018  



                           DIFFERENTIAL              5:46 AM CDT  

 

                                        



Results for this procedure are in the results section.



                     PHOSPHORUS          Routine             10/29/2018  



                                         5:46 AM CDT  

 

                                        



Results for this procedure are in the results section.



                     MAGNESIUM           Routine             10/29/2018  



                                         5:46 AM CDT  

 

                                        



Results for this procedure are in the results section.



                     BASIC METABOLIC PANEL (7)     Routine             10/29/2018  



                                         5:46 AM CDT  

 

                                        



Results for this procedure are in the results section.



                     CBC W/PLT COUNT & AUTO     Routine             10/29/2018  



                           DIFFERENTIAL              5:46 AM CDT  

 

                                        



Results for this procedure are in the results section.



                     CBC W/PLT COUNT & AUTO     Routine             10/28/2018  



                           DIFFERENTIAL              5:55 AM CDT  

 

                                        



Results for this procedure are in the results section.



                     PHOSPHORUS          Routine             10/28/2018  



                                         5:55 AM CDT  

 

                                        



Results for this procedure are in the results section.



                     MAGNESIUM           Routine             10/28/2018  



                                         5:55 AM CDT  

 

                                        



Results for this procedure are in the results section.



                     BASIC METABOLIC PANEL (7)     Routine             10/28/2018  



                                         5:55 AM CDT  

 

                                        



Results for this procedure are in the results section.



                     CBC W/PLT COUNT & AUTO     Routine             10/28/2018  



                           DIFFERENTIAL              5:55 AM CDT  

 

                                        



Results for this procedure are in the results section.



                     HEMODIALYSIS INPATIENT     Routine             10/27/2018  



                                         11:54 AM CDT  

 

                                        



Results for this procedure are in the results section.



                     CBC W/PLT COUNT & AUTO     Routine             10/27/2018  



                           DIFFERENTIAL              6:39 AM CDT  

 

                                        



Results for this procedure are in the results section.



                     KAPPA / LAMBDA LIGHT     Routine             10/27/2018  



                           CHAINS, SERUM             6:39 AM CDT  

 

                                        



Results for this procedure are in the results section.



                     PROTEIN ELECTROPHORESIS,     AP Routine          10/27/2018  



                           SERUM                     6:39 AM CDT  

 

                                        



Results for this procedure are in the results section.



                     PHOSPHORUS          Routine             10/27/2018  



                                         6:39 AM CDT  

 

                                        



Results for this procedure are in the results section.



                     MAGNESIUM           Routine             10/27/2018  



                                         6:39 AM CDT  

 

                                        



Results for this procedure are in the results section.



                     BASIC METABOLIC PANEL (7)     Routine             10/27/2018  



                                         6:39 AM CDT  

 

                                        



Results for this procedure are in the results section.



                     CBC W/PLT COUNT & AUTO     Routine             10/27/2018  



                           DIFFERENTIAL              6:39 AM CDT  

 

                                         



                           TRANSFUSION SERVICE       10/26/2018  



                           REPORT - SCAN             5:53 PM CDT  

 

                                        



Results for this procedure are in the results section.



                     ULTRAFILTRATION HD CRRT     Routine             10/26/2018  



                                         11:49 AM CDT  

 

                                        



Results for this procedure are in the results section.



                     CBC W/PLT COUNT & AUTO     Routine             10/26/2018  



                           DIFFERENTIAL              8:55 AM CDT  

 

                                        



Results for this procedure are in the results section.



                     PHOSPHORUS          Routine             10/26/2018  



                                         8:55 AM CDT  

 

                                        



Results for this procedure are in the results section.



                     MAGNESIUM           Routine             10/26/2018  



                                         8:55 AM CDT  

 

                                        



Results for this procedure are in the results section.



                     BASIC METABOLIC PANEL (7)     Routine             10/26/2018  



                                         8:55 AM CDT  

 

                                        



Results for this procedure are in the results section.



                     CBC W/PLT COUNT & AUTO     Routine             10/26/2018  



                           DIFFERENTIAL              8:55 AM CDT  

 

                                         



                     PCI                 10/25/2018          Heart failure, 



                           1:29 PM CDT               unspecified HF 



                                         chronicity, unspecified 



                                         heart failure type (HCC) 

 

                                         



                     R & L CATH (+/- SATS &      10/25/2018          Heart failure, 



                     CARDIAC OUTPUT)      1:29 PM CDT         unspecified HF 



                                         chronicity, unspecified 



                                         heart failure type (HCC) 

 

                                        



Results for this procedure are in the results section.



                     TYPE AND SCREEN,     Routine             10/25/2018  



                           AUTOMATED                 8:27 AM CDT  

 

                                        



Results for this procedure are in the results section.



                     FOLATE, RBC         Routine             10/25/2018  



                                         6:29 AM CDT  

 

                                        



Results for this procedure are in the results section.



                     RETICULOCYTE COUNT     Routine             10/25/2018  



                                         6:29 AM CDT  

 

                                        



Results for this procedure are in the results section.



                     CBC W/PLT COUNT & AUTO     Routine             10/25/2018  



                           DIFFERENTIAL              6:25 AM CDT  

 

                                        



Results for this procedure are in the results section.



                     PERIPHERAL BLOOD SMEAR -     Routine             10/25/2018  



                           HOLD ONLY                 6:25 AM CDT  

 

                                        



Results for this procedure are in the results section.



                     CBC W/PLT COUNT & AUTO     Routine             10/25/2018  



                           DIFFERENTIAL              6:25 AM CDT  

 

                                        



Results for this procedure are in the results section.



                     PT/APTT             Routine             10/25/2018  



                                         6:25 AM CDT  

 

                                        



Results for this procedure are in the results section.



                     FIBRINOGEN          Routine             10/25/2018  



                                         6:25 AM CDT  

 

                                        



Results for this procedure are in the results section.



                     D-DIMER             Routine             10/25/2018  



                                         6:25 AM CDT  

 

                                        



Results for this procedure are in the results section.



                     RHEUMATOID FACTOR TITER     Routine             10/25/2018  



                                         6:24 AM CDT  

 

                                        



Results for this procedure are in the results section.



                     VITAMIN B12         Routine             10/25/2018  



                                         6:24 AM CDT  

 

                                        



Results for this procedure are in the results section.



                     RHEUMATOID FACTOR AB,     Routine             10/25/2018  



                           REFLEX TO TITER           6:24 AM CDT  

 

                                        



Results for this procedure are in the results section.



                     PROTEIN ELECTROPHORESIS,     AP Routine          10/25/2018  



                           SERUM                     6:24 AM CDT  

 

                                        



Results for this procedure are in the results section.



                     HIV-1 ANTIGEN WITH     Routine             10/25/2018  



                           HIV-1/2 ANTIBODY          6:24 AM CDT  

 

                                        



Results for this procedure are in the results section.



                     ANTI-NUCLEAR ANTIBODY     Routine             10/25/2018  



                           (MEGHAN)                     6:24 AM CDT  

 

                                        



Results for this procedure are in the results section.



                     HEPATITIS B SURFACE     Routine             10/25/2018  



                           ANTIGEN                   6:23 AM CDT  

 

                                        



Results for this procedure are in the results section.



                     PHOSPHORUS          Routine             10/25/2018  



                                         6:23 AM CDT  

 

                                        



Results for this procedure are in the results section.



                     MAGNESIUM           Routine             10/25/2018  



                                         6:23 AM CDT  

 

                                        



Results for this procedure are in the results section.



                     BASIC METABOLIC PANEL (7)     Routine             10/25/2018  



                                         6:23 AM CDT  

 

                                        



Results for this procedure are in the results section.



                     EBV VIRAL LOAD      Routine             10/25/2018  



                                         6:23 AM CDT  

 

                                        



Results for this procedure are in the results section.



                     CMV PCR, QUANTITATIVE     Routine             10/25/2018  



                                         6:23 AM CDT  

 

                                        



Results for this procedure are in the results section.



                     TRANSESOPHAGEAL ECHO     Routine             10/25/2018  



                                         12:00 AM CDT  

 

                                         



                     CONT WAVE PULSED DOPPLER     Routine             10/24/2018  



                                         8:11 PM CDT  

 

                                         



                     COLOR-FLOW MAPPING     Routine             10/24/2018  



                                         8:11 PM CDT  

 

                                        



Results for this procedure are in the results section.



                     CBC W/PLT COUNT & AUTO     Routine             10/24/2018  



                           DIFFERENTIAL              3:49 AM CDT  

 

                                        



Results for this procedure are in the results section.



                     PHOSPHORUS          Routine             10/24/2018  



                                         3:49 AM CDT  

 

                                        



Results for this procedure are in the results section.



                     MAGNESIUM           Routine             10/24/2018  



                                         3:49 AM CDT  

 

                                        



Results for this procedure are in the results section.



                     BASIC METABOLIC PANEL (7)     Routine             10/24/2018  



                                         3:49 AM CDT  

 

                                        



Results for this procedure are in the results section.



                     CBC W/PLT COUNT & AUTO     Routine             10/24/2018  



                           DIFFERENTIAL              3:49 AM CDT  

 

                                        



Results for this procedure are in the results section.



                     CT CHEST WITHOUT IV     Routine             10/24/2018  



                           CONTRAST                  3:24 AM CDT  

 

                                        



Results for this procedure are in the results section.



                     CT ABDOMEN/PELVIS WITH IV     STAT                10/23/2018  



                           CONTRAST                  5:50 PM CDT  

 

                                        



Results for this procedure are in the results section.



                     CBC W/PLT COUNT & AUTO     STAT                10/23/2018  



                           DIFFERENTIAL              1:52 PM CDT  

 

                                        



Results for this procedure are in the results section.



                     CBC W/PLT COUNT & AUTO     STAT                10/23/2018  



                           DIFFERENTIAL              1:52 PM CDT  

 

                                        



Results for this procedure are in the results section.



                     LIPASE              STAT                10/23/2018  



                                         1:52 PM CDT  

 

                                        



Results for this procedure are in the results section.



                     HEPATIC FUNCTION PANEL     STAT                10/23/2018  



                                         1:52 PM CDT  

 

                                        



Results for this procedure are in the results section.



                     BASIC METABOLIC PANEL (7)     STAT                10/23/2018  



                                         1:52 PM CDT  

 

                                        



Results for this procedure are in the results section.



                     AMYLASE             STAT                10/23/2018  



                                         1:52 PM CDT  

 

                                         



                           ECHOCARDIOGRAM REPORT -      10/19/2018  



                           SCAN                      3:23 PM CDT  

 

                                        



Results for this procedure are in the results section.



                 US ABDOMEN COMPLETE     Routine         10/19/2018      ESRD (end stage renal 



                           3:08 PM CDT               disease) (HCC) 

 

                                        



Results for this procedure are in the results section.



                 2D ECHO W/ DOPPLER     Routine         10/19/2018      ESRD (end stage renal 



                     (CW/PW/COLOR)       1:38 PM CDT         disease) (HCC) 

 

                                        



Results for this procedure are in the results section.



                 FLOW PRA CLASS II WITH     Routine         2018      Patient awaiting renal 



                     REFLEX TO ANTIBODY      10:34 AM CDT        transplant 



                                         SPECIFICITY    

 

                                        



Results for this procedure are in the results section.



                 FLOW PRA CLASS I WITH     Routine         2018      Patient awaiting renal 



                     REFLEX TO ANTIBODY      10:34 AM CDT        transplant 



                                         SPECIFICITY    

 

                                        



Results for this procedure are in the results section.



                 FLOW PRA CLASS II WITH     Routine         2018      Patient awaiting renal 



                     REFLEX TO ANTIBODY      12:23 PM CDT        transplant 



                                         SPECIFICITY    

 

                                        



Results for this procedure are in the results section.



                 FLOW PRA CLASS I WITH     Routine         2018      Patient awaiting renal 



                     REFLEX TO ANTIBODY      12:23 PM CDT        transplant 



                                         SPECIFICITY    

 

                                        



Results for this procedure are in the results section.



                 FLOW PRA CLASS II WITH     Routine         2018      Patient awaiting renal 



                     REFLEX TO ANTIBODY      3:36 PM CST         transplant 



                                         SPECIFICITY    

 

                                        



Results for this procedure are in the results section.



                 FLOW PRA CLASS I WITH     Routine         2018      Patient awaiting renal 



                     REFLEX TO ANTIBODY      3:36 PM CST         transplant 



                                         SPECIFICITY    



after 2017



Results

* Venous doppler leg, right (2018  1:38 PM CST)





   



                 Component       Value           Ref Range       Performed At

 

   



                           Ejection Fraction         [facility]



                                         [facility]









 



                           Impressions               Performed At

 

 



                           Right Impression          [facility]



                           1. There is no deep venous obstruction in the common femoral, profunda     [facility]



                                         femoral, femoral, popliteal, posterior tibial or peroneal veins. 



                                         2. There is no superficial venous obstruction in the great saphenous vein. 



                                         Conclusions 



                                         Summary 



                                         Venous duplex imaging and compression of the right lower extremity was 



                                         performed. The veins were adequately visualized. The right venous system 



                                         was patent and compressible with no evidence of thrombus. The venous 



                                         Doppler waveforms were pulsatile indicating possible elevated right heart 



                                         filling pressure. 



                                         Signature 



                                         ---------------------------------------------------------------- 



                                         Electronically signed by Kendra Bhatt MD(Interpreting 



                                         physician) on 2018 05:23 PM 



                                         ---------------------------------------------------------------- 



                                         Velocities are measured in cm/s ; Diameters are measured in cm 









 



                           Narrative                 Performed At

 

 



                           PV LAB - Lower Extremities DVT Study     [facility]



                           Demographics              [facility]



                                         Patient NameBORIS PURI Date of Study 



                                         2018 



                                          NOEMI 



                                         MRN 



                                         [id number] 



                                         Visit 



                                         [id number]

 



                                          



                                         Accession [id number] 



                                         1980 



                                         Referring ELDON ALVAREZ Room Number 



                                         ED13 



                                         Physician 



                                         Sonographer Jonah McknightlPhysicidashawn Odonnell 



                                          T 



                                         Procedure 



                                         Type of Study: 



                                         Veins: Lower Extremities DVT Study, VENOUS DOPPLER LEG, RIGHT. 



                                         Indications for Study:Knee pain and Leg pain . 



                                         Patient Status:STAT. 



                                         Study Location:Portable. 



                                         Technical Quality:Adequate visualization. 



                                         Risk Factors 



                                         History of Disease 



                                         +------------------------------------------------------------+----+--------+ 



                                         !Diagnosis

 



                                          !Date!Comments! 



                                         +------------------------------------------------------------+----+--------+ 



                                         !History/Risk 



                                         Factors:

 



                                          !!leg pain! 



                                         +------------------------------------------------------------+----+--------+ 









                                        Procedure Note

 

                                        



Interface, External Ris In - 2018  5:23 PM CST



PV LAB - Lower Extremities DVT Study



 Demographics

 

 Patient Name      BORIS PURI     Date of Study       2018

                   NOEMI

 

 MRN               [id number]          Age                 38

 

 Visit Number      [id number]        Gender              Male

 

 Accession Number  [id number]          Date of Birth       1980

 

 Referring         ELDON ALVAREZ     Room Number         ED13

 Physician

 

 Sonographer       Bola ASH         Interpreting        Duarte Cespedes          Physician           MD Lianna Odonnell

                   RVT

 

Procedure

Type of Study:

 

 Veins: Lower Extremities DVT Study, VENOUS DOPPLER LEG, RIGHT.

 

Indications for Study:Knee pain and Leg pain .



Patient Status:STAT.

Study Location:Portable.

Technical Quality:Adequate visualization.



Risk Factors

History of Disease

                                        +------------------------------------------------------------+----+--------+

!Diagnosis                                                   !Date!Comments!

                                        +------------------------------------------------------------+----+--------+

!History/Risk Factors:                                       !    !leg pain!

                                        +------------------------------------------------------------+----+--------+



Impressions

Right Impression

                                        1. There is no deep venous obstruction in the common femoral, profunda

femoral, femoral, popliteal, posterior tibial or peroneal veins.

                                        2. There is no superficial venous obstruction in the great saphenous vein.



 Conclusions

 

 Summary

 

 Venous duplex imaging and compression of the right lower extremity was

 performed. The veins were adequately visualized. The right venous system

 was patent and compressible with no evidence of thrombus. The venous

 Doppler waveforms were pulsatile indicating possible elevated right heart

 filling pressure.

 

 Signature

 

 ----------------------------------------------------------------

 Electronically signed by Kendra Bhatt MD(Interpreting

 physician) on 2018 05:23 PM

 ----------------------------------------------------------------

 

Velocities are measured in cm/s ; Diameters are measured in cm











   



                 Performing Organization     Address         City/State/Zipcode     Phone Number

 

   



                                         [facility]   



                                         [facility]   





* PT/aPTT (2018 11:21 PM CST)



Only the most recent of 2 results within the time period is included.





   



                 Component       Value           Ref Range       Performed At

 

   



                 Protime         16.2 (H)        11.7 - 14.7 seconds     [facility]

 

   



                 INR             1.3             <=5.9           [facility]

 

   



                 PTT             30.5            22.5 - 36.0 seconds     [facility]













                                         Specimen

 





                                         Blood - Arm, Left









 



                           Narrative                 Performed At

 

 



                           RECOMMENDED COUMADIN/WARFARIN INR THERAPY RANGES     [facility]



                           STANDARD DOSE: 2.0 - 3.0 Includes: PROPHYLAXIS for venous thrombosis,     [facility]



                                         systemic embolization; TREATMENT for venous thrombosis and/or pulmonary embolus.

 



                                         HIGH RISK: Target INR is 2.5-3.5 for patients with mechanical heart valves. 









   



                 Performing Organization     Address         City/State/Zipcode     Phone Number

 

   



                 [facility]     [address] [place] [address] [phone]





                                         MEDICAL CENTER   





* CBC with platelet count + automated diff (2018 11:21 PM CST)



Only the most recent of 9 results within the time period is included.





   



                 Component       Value           Ref Range       Performed At

 

   



                 WBC             6.5             3.5 - 10.5 K/L     [facility]

 

   



                 RBC             3.95 (L)        4.63 - 6.08 M/L     [facility]

 

   



                 Hemoglobin      10.6 (L)        13.7 - 17.5 GM/DL     [facility]

 

   



                 Hematocrit      33.8 (L)        40.1 - 51.0 %     [facility]

 

   



                 MCV             85.6            79.0 - 92.2 fL     [facility]

 

   



                 MCH             26.8            25.7 - 32.2 pg     [facility]

 

   



                 MCHC            31.4 (L)        32.3 - 36.5 GM/DL     [facility]

 

   



                 RDW             19.3 (H)        11.6 - 14.4 %     [facility]

 

   



                 Platelets       131 (L)         150 - 450 K/CU MM     [facility]

 

   



                 MPV             9.8             9.4 - 12.4 fL     [facility]

 

   



                 nRBC            0               0 - 0 /100 WBC     [facility]

 

   



                 % Neutros       58              %               [facility]

 

   



                 % Lymphs        21              %               [facility]

 

   



                 % Monos         8               %               [facility]

 

   



                 % Eos           11              %               [facility]

 

   



                 % Baso          1               %               [facility]

 

   



                 # Neutros       3.82            1.78 - 5.38 K/L     [facility]

 

   



                 # Lymphs        1.39            1.32 - 3.57 K/L     [facility]

 

   



                 # Monos         0.52            0.30 - 0.82 K/L     [facility]

 

   



                 # Eos           0.72 (H)        0.04 - 0.54 K/L     [facility]

 

   



                 # Baso          0.08            0.01 - 0.08 K/L     [facility]

 

   



                 Immature        0               0 - 1 %         [facility]



                           Granulocytes-Relative       [facility]













                                         Specimen

 





                                         Blood - Arm, Left









   



                 Performing Organization     Address         City/State/Zipcode     Phone Number

 

   



                 [facility]     [address] [place] [address] [phone]





                                         MEDICAL [place]   





* Basic Metabolic Panel (2018 11:21 PM CST)



Only the most recent of 9 results within the time period is included.





   



                 Component       Value           Ref Range       Performed At

 

   



                 Sodium          141             136 - 145 meq/L     [facility]

 

   



                 Potassium       4.8             3.5 - 5.1 meq/L     [facility]

 

   



                 Chloride        103             98 - 107 meq/L     [facility]

 

   



                 CO2             25              22 - 29 meq/L     [facility]

 

   



                 BUN             46 (H)          7 - 21 mg/dL     [facility]

 

   



                 Creatinine      8.75 (H)        0.57 - 1.25 mg/dL     [facility]

 

   



                 Glucose         103             70 - 105 mg/dL     [facility]

 

   



                 Calcium         9.1             8.4 - 10.2 mg/dL     [facility]

 

   



                 EGFR            7Comment: ESTIMATED GFR IS NOT     mL/min/1.73 sq m     [facility]





                           AS ACCURATE AS CREATININE      [facility]



                                         CLEARANCE IN PREDICTING  



                                         GLOMERULAR FILTRATION RATE.  



                                         ESTIMATED GFR IS NOT  



                                         APPLICABLE FOR DIALYSIS  



                                         PATIENTS.  













                                         Specimen

 





                                         Blood - Arm, Left









   



                 Performing Organization     Address         City/State/Zipcode     Phone Number

 

   



                 [facility]     [address], [place] [id number] [phone]





                                         MEDICAL CENTER   





* CARDIAC CATH REPORT - SCAN (10/31/2018  2:40 PM CDT)





 



                           Narrative                 Performed At

 

 



                                         This result has an attachment that is not available. 





* VASCULAR DIAGRAM -SCAN (10/31/2018  2:40 PM CDT)





 



                           Narrative                 Performed At

 

 



                                         This result has an attachment that is not available. 





* RHYTHM STRIP - SCAN (10/31/2018  2:40 PM CDT)





 



                           Narrative                 Performed At

 

 



                                         This result has an attachment that is not available. 





* ECHOCARDIOGRAM REPORT - SCAN (10/30/2018  6:30 PM CDT)





 



                           Narrative                 Performed At

 

 



                                         This result has an attachment that is not available. 





* Tissue Exam (10/30/2018  8:07 AM CDT)





   



                 Component       Value           Ref Range       Performed At

 

   



                     Case Report         Surgical Pathology      [facility]



                           Report      [facility]



                                          Case:  



                                         [id number]  



                                           



                                           



                                         Authorizing Provider:Vern Echevarria MD (Mark)  



                                         Collected:  



                                         10/30/2018  



                                         0807  



                                         Ordering Location:  



                                         [address]  



                                         Received:  



                                         10/30/2018  



                                         0807  



                                           



                                           



                                         Service  



                                           



                                           



                                           



                                           



                                         Pathologist:  



                                          Naldo Duval MD  



                                           



                                           



                                           



                                         Specimen:Abdomen, FAT  



                                         PAD BX EVALUATION FOR  



                                         AMYLOID  



                                           



                                           



                                           

 

   



                     DIAGNOSIS           SOFT TISSUE, "FAT PAD",      [facility]



                           ULTRASOUND-GUIDED CORE NEEDLE      [facility]



                                         BIOPSY:  



                                         FIBROADIPOSE TISSUE WITHOUT  



                                         SIGNIFICANT HISTOPATHOLOGIC  



                                         ALTERATION.  



                                         CONGO RED SPECIAL STAIN IS  



                                         NEGATIVE FOR AMYLOID.  



                                         Signing Pathologist  



                                         Direct Phone Line:  



                                         [phone]  

 

   



                     CPT Code(s)         47033, 30782        [facility]

 

   



                     CLINICAL HISTORY     Evaluate for amyloid      [facility]

 

   



                     SPECIMEN SOURCE     Right abdomen fat pad biopsy      [facility]

 

   



                     GROSS DESCRIPTION     The specimen is received in a      [facility]



                           formalin-filled container and      [facility]



                                         labeled with the patient's  



                                         information and labeled  



                                         "abdomen right fat pad biopsy"  



                                         and consists of two  



                                         yellow-white core biopsies  



                                         both measuring 1.5 cm in  



                                         length, submitted entirely A1.  



                                         CG/pl  

 

   



                     MICROSCOPIC DESCRIPTION     Performed.          [facility]

 

   



                     SPECIAL STUDIES     The following special studies      [facility]



                           were performed on this case      [facility]



                                         and the interpretation is  



                                         incorporated in the diagnostic  



                                         report above:  



                                         BLOCK A1- CONGO RED.  

 

   



                     Gross assessment was     [facility]



                     performed at        [facility]



                                         Pathology, [address] [place] [address], Tel  



                                         [phone]  

 

   



                     Technical component was     [facility]



                     performed at        [facility]



                                         Pathology, [address] [place] [address], Tel  



                                         [phone]  

 

   



                     Professional component     [facility]



                     was performed at     [facility]



                                         Pathology, [address] [place] [address], Tel  



                                         [phone]  













                                         Specimen

 





                                         Tissue - Abdomen









   



                 Performing Organization     Address         City/[facility]     Phone Number

 

   



                 [place] [address]     [phone] [place] [address]   





* Phosphorus (10/30/2018  4:53 AM CDT)



Only the most recent of 7 results within the time period is included.





   



                 Component       Value           Ref Range       Performed At

 

   



                 Phosphorus      5.6 (H)         2.3 - 4.7 mg/dL     [facility]













                                         Specimen

 





                                         Blood - Arm, Right









   



                 Performing Organization     Address         City/[facility]     Phone Number

 

   



                 [place] [address]     [phone]   





* Magnesium (10/30/2018  4:53 AM CDT)



Only the most recent of 7 results within the time period is included.





   



                 Component       Value           Ref Range       Performed At

 

   



                 Magnesium       2.3             1.6 - 2.6 mg/dL     [facility]













                                         Specimen

 





                                         Blood - Arm, Right









   



                 Performing Organization     Address         City[facility]     Phone Number

 

   



                 [place] [address]     [phone] [place] [address]   





* US Core Biopsy (10/29/2018  4:50 PM CDT)





 



                           Narrative                 Performed At

 

 



                           FINAL REPORT              [employer]



                                         PATIENT ID: [id number] 



                                         Ultrasound guided subcutaneous fat pad biopsy dated 10/29/2018 



                                         Procedure: Subcutaneous fat pad biopsy 



                                         Pre-procedure diagnosis: Suspicious for amyloidosis 



                                         Post-procedure diagnosis: Suspicious for amyloidosis 



                                         Radiologist: Celestine Cook MD 



                                         Assistant: None 



                                         Sedation: None. 



                                         Anesthesia: 1% Xylocaine local anesthesia. 



                                         Technique/Specimen removed: After obtaining informed consent, 



                                         ultrasound-guided core biopsy of the subcutaneous fat pad was 



                                         performed under usual sterile technique. After local anesthetic, core 



                                         biopsy was performed under ultrasound guidance. 5 passes were 



                                         obtained with an 18-gauge core biopsy needle. 



                                         Complication: None 



                                         Estimated Blood Loss: None 



                                         Graft/Implants: None 



                                         Impression: Successful ultrasound-guided core biopsy of the 



                                         subcutaneous fat pad. 



                                         Signed: Celestine Cook MD 



                                         Report Verified Date/Time:10/29/2018 17:14:49 



                                         Reading Location: [address] Ultrasound Reading Room 



                                         Electronically signed by: CELESTINE COOK M.D. on 10/29/2018 05:14 PM

 









                                        Procedure Note

 

                                        



Interface, External Ris In - 10/29/2018  5:17 PM CDT



FINAL REPORT

 

PATIENT ID:   [id number]

 

 

Ultrasound guided subcutaneous fat pad biopsy dated 10/29/2018

 

Procedure: Subcutaneous fat pad biopsy

 

Pre-procedure diagnosis: Suspicious for amyloidosis

 

Post-procedure diagnosis: Suspicious for amyloidosis

 

Radiologist: Celestine Cook MD

 

Assistant: None

 

Sedation: None.

 

Anesthesia: 1% Xylocaine local anesthesia.

 

Technique/Specimen removed: After obtaining informed consent, 

ultrasound-guided core biopsy of the subcutaneous fat pad was 

performed under usual sterile technique. After local anesthetic, core 

biopsy was performed under ultrasound guidance. 5 passes were 

obtained with an 18-gauge core biopsy needle.

 

Complication: None

 

Estimated Blood Loss: None

 

Graft/Implants: None

 

Impression: Successful ultrasound-guided core biopsy of the 

subcutaneous fat pad.

 

Signed: Celestine Cook MD

Report Verified Date/Time:  10/29/2018 17:14:49

 

Reading Location: [address] Ultrasound Reading Room

      Electronically signed by: CELESTINE COOK M.D. on 10/29/2018 05:14 PM

 









   



                 Performing Organization     Address         City/State/Zipcode     Phone Number

 

   



                                          [facility]   





* HEMODIALYSIS INPATIENT (10/27/2018 11:54 AM CDT)





 



                           Narrative                 Performed At

 

 



                                         Abardo, Aelyn, RN 10/27/2018 11:56 AM 



                                         Received pt awake alert responsive. Explained hd tx plan today pt 



                                         agreed verbalized understanding.Cannulated left avf without 



                                         difficulty using buttonhole technique. 



                                         Lab Results 



                                         Component Value Date 



                                         WBC 6.8 10/27/2018 



                                         HGB 11.9 (L) 10/27/2018 



                                         HCT 38.7 (L) 10/27/2018 



                                         MCV 85.4 10/27/2018 



                                          (L) 10/27/2018 



                                         Chemistry 



                                          



                                         Component Value Date/Time 



                                          (L) 10/27/2018 0639 



                                         K 5.1 10/27/2018 0639 



                                         CL 99 10/27/2018 0639 



                                         CO2 25 10/27/2018 0639 



                                         BUN 32 (H) 10/27/2018 0639 



                                         CREATININE 8.60 (H) 10/27/2018 0639 



                                          



                                         Component Value Date/Time 



                                         CALCIUM 8.5 10/27/2018 0639 



                                         ALKPHOS 167 (H) 10/23/2018 1352 



                                         AST 20 10/23/2018 1352 



                                         ALT 11 10/23/2018 1352 



                                         BILITOT 1.2 10/23/2018 1352 



                                         Results for BORIS PURI (MRN [id number]) as of 10/27/2018 



                                         11:55 



                                         Ref. Range 10/25/2018 06:23 



                                         Hepatitis B Surface Ag Latest Ref Range: NonreactiveNonreactive 



                                         To monitor pt. 





* Kappa / lambda light chains, serum (10/27/2018  6:39 AM CDT)





   



                 Component       Value           Ref Range       Performed At

 

   



                 Kappa Lt Chain,Free     205.1 (H)       3.3 - 19.4 mg/L     [facility]

 

   



                 Lambda Lt Chain,Free     176.0 (H)       5.7 - 26.3 mg/L     [facility]

 

   



                 Kappa/Lambda,Free     1.17            0.26 - 1.65     [facility]



                           Comment:                  [facility]



                                         Free kappa/lambda ratio in  



                                         serum of normal individuals is  



                                         0.26-1.65. Excess  



                                         production of free kappa or  



                                         lambda chains can alter this  



                                         ratio. Monoclonal  



                                         free light chains are found in  



                                         serum of patients with  



                                         multiple myeloma,  



                                         Waldenstrom's  



                                         macroglobulinemia, mu-heavy  



                                         chain disease, primary  



                                         amyloidosis,  



                                         light chain deposition  



                                         disease, monoclonal gammopathy  



                                         of undetermined  



                                         significance, and  



                                         lymphoproliferative disorders.  



                                         Measurement of free light  



                                         chain concentration in serum  



                                         is useful for diagnosis,  



                                         prognosis, monitoring  



                                         disease activity and following  



                                         response to therapy of these  



                                         disorders.  













                                         Specimen

 





                                         Blood - Arm, Right









 



                           Narrative                 Performed At

 

 



                           Performing Lab            [facility]



                            [facility]



                                          [employer] 



                                          [address] [place] [address] 



                                          PRINCE Villafana MD, PhD, AYUSH 









   



                 Performing Organization     Address         City/State/Zipcode     Phone Number

 

   



                     [facility]     [facility], [address]     [place] 



                     [facility]        [address]      [id number] 





* Protein electrophoresis, serum (10/27/2018  6:39 AM CDT)



Only the most recent of 2 results within the time period is included.





   



                 Component       Value           Ref Range       Performed At

 

   



                 Albumin Fraction     3.7             3.5 - 5.5 g/dL     [facility]

 

   



                 Alpha 1 Fraction     0.4             0.2 - 0.4 g/dL     [facility]

 

   



                 Alpha 2 Fraction     0.6             0.5 - 0.9 g/dL     [facility]

 

   



                 Beta Fraction     0.9             0.6 - 1.1 g/dL     [facility]

 

   



                 Gamma Globulin Fraction     1.8 (H)         0.7 - 1.7 g/dL     [facility]

 

   



                     Interpretation      Slight polyclonal elevation of      [facility]



                           gamma fraction, suggesting      [facility]



                                         mild chronic inflammatory  



                                         response. No monoclonal bands  



                                         detected. No significant  



                                         change from previous study  



                                         performed 10-25-18.  

 

   



                     Pathologist:        Erika Watson MD      [facility]



                           (electronic signature)      [facility]

 

   



                 Protein, Total     7.4             6.0 - 8.3 gm/dL     [facility]













                                         Specimen

 





                                         Blood - Arm, Right









   



                 Performing Organization     Address         City/State/Zipcode     Phone Number

 

   



                 [facility]     [address] [place] [address] [phone]





                                         [facility]   





* TRANSFUSION SERVICE REPORT - SCAN (10/26/2018  5:53 PM CDT)





 



                           Narrative                 Performed At

 

 



                                         This result has an attachment that is not available. 





* ULTRAFILTRATION HD CRRT (10/26/2018 11:49 AM CDT)





 



                           Narrative                 Performed At

 

 



                                         Gurpreet Stoner RN 10/26/2018 11:49 AM 



                                         Pt had ultrafiltration done this morning via left forearm AVF. 



                                         Tolerated treatment well, NET UF=3 L. 



                                         Lab Results 



                                         Component Value Date 



                                         WBC 4.9 10/25/2018 



                                         HGB 10.9 (L) 10/25/2018 



                                         HCT 36.2 (L) 10/25/2018 



                                         MCV 85.0 10/25/2018 



                                          (L) 10/25/2018 



                                         Lab Results 



                                         Component Value Date 



                                         HEPBSAG Nonreactive 10/25/2018 



                                         ] 



                                         Lab Results 



                                         Component Value Date 



                                         HEPBIGM Nonreactive 10/28/2014 



                                         HEPCAB Nonreactive 10/28/2014 



                                         Lab Results 



                                         Component Value Date 



                                         HIV1X2 Nonreactive 10/28/2014 



                                         Lab Results 



                                         Component Value Date 



                                         GLUCOSE 73 10/25/2018 



                                         CALCIUM 8.5 10/25/2018 



                                          10/25/2018 



                                         K 4.7 10/25/2018 



                                         CO2 30 (H) 10/25/2018 



                                         CL 96 (L) 10/25/2018 



                                         BUN 33 (H) 10/25/2018 



                                         CREATININE 8.23 (H) 10/25/2018 



                                         Coag Profile: 



                                         Protime 



                                         Date Value Ref Range Status 



                                         10/25/2018 16.6 (H) 11.7 - 14.7 seconds Final 



                                         INR 



                                         Date Value Ref Range Status 



                                         10/25/2018 1.3 <=5.9 Final 



                                         PTT 



                                         Date Value Ref Range Status 



                                         10/25/2018 32.4 22.5 - 36.0 seconds Final 





* Type and screen, automated (10/25/2018  8:27 AM CDT)





   



                 Component       Value           Ref Range       Performed At

 

   



                     ABO/RH AUTOMATED (BEAKER)     O POSITIVE          [facility]

 

   



                     Ab Scrn             NEGATIVE            [facility]













                                         Specimen

 





                                         Blood









   



                 Performing Organization     Address         City/[facility]     Phone Number

 

   



                 [address]   





* Reticulocyte count (10/25/2018  6:29 AM CDT)





   



                 Component       Value           Ref Range       Performed At

 

   



                 % Retic         1.5             0.5 - 1.8 %     [facility]













                                         Specimen

 





                                         Blood









   



                 Performing Organization     Address         City/[facility]     Phone Number

 

   



                 [address]   





* Folate, RBC (10/25/2018  6:29 AM CDT)





   



                 Component       Value           Ref Range       Performed At

 

   



                 Folate, Rbc     753             >280 ng/mL RBC     [facility]



                                         [facility]













                                         Specimen

 





                                         Blood









 



                           Narrative                 Performed At

 

 



                           Performing Lab            [facility]



                            [facility]



                                          [facility] [address] [address] 



                                          PRINCE Villafana MD, PhD, AYUSH 









   



                 Performing Organization     Address         City/[facility]     Phone Number

 

   



                     [facility]     [facility] [place], [address] [place], [place] [address]      [id number] 





* Peripheral Blood Smear - Hold only (10/25/2018  6:25 AM CDT)





   



                 Component       Value           Ref Range       Performed At

 

   



                     Peripheral Smear Save     saved               [facility]













                                         Specimen

 





                                         Blood









   



                 Performing Organization     Address         City/[facility]/Zipcode     Phone Number

 

   



                 [address] [address]     [phone] [place] [address]   





* Fibrinogen (10/25/2018  6:25 AM CDT)





   



                 Component       Value           Ref Range       Performed At

 

   



                 Fibrinogen      318             225 - 434 mg/dl     [facility]













                                         Specimen

 





                                         Blood









   



                 Performing Organization     Address         City/[facility] Cent[facility]     Phone Number

 

   



                 [place] [address]     [phone]   





* D-dimer (10/25/2018  6:25 AM CDT)





   



                 Component       Value           Ref Range       Performed At

 

   



                 D-Dimer, Quant     1.31 (H)        <0.50 MG/L FEU     [facility]













                                         Specimen

 





                                         Blood









 



                           Narrative                 Performed At

 

 



                                         Intended Use: The D-Dimer Assay can be used to aid in the diagnosis of Deep Vein

                                         [facility]



                           Thrombosis (DVT) and Pulmonary Embolism Disease (PED).     [facility]



                                         In patients with low pre-test probability, various studies concerning STA 



                                         Liatest D-dimer test have reported that with a cutoff value of 0.50 MG/L FEU, 



                                         the Negative Predictive Value (NPV) regarding the exclusion of thrombosis is 



                                         within % range. 









   



                 Performing Organization     Address         City/State/[facility]     Phone Number

 

   



                 [place] [address]     [phone]   





* HIV-1 Antigen with HIV-1/2 Antibody (10/25/2018  6:24 AM CDT)





   



                 Component       Value           Ref Range       Performed At

 

   



                 HIV-1 Antigen with HIV     NON-REACTIVE     Nonreactive     [facility]



                           1&2 Antibody              [facility]













                                         Specimen

 





                                         Blood









   



                 Performing Organization     Address         City/[facility]     Phone Number

 

   



                 [address] [address]     [phone] [place] [address]   





* Rheumatoid factor Ab, reflex to titer (10/25/2018  6:24 AM CDT)





   



                 Component       Value           Ref Range       Performed At

 

   



                     Rheumatoid Factor     Positive            [facility]













                                         Specimen

 





                                         Blood









   



                 Performing Organization     Address         City/[facility]/Zipcode     Phone Number

 

   



                 [address] [address] [phone]





                                         [facility]   





* Rheumatoid factor titer (10/25/2018  6:24 AM CDT)





   



                 Component       Value           Ref Range       Performed At

 

   



                     Rheumatoid Factor TTR     1:2                 [facility]













                                         Specimen

 





                                         Blood









   



                 Performing Organization     Address         City/[facility]     Phone Number

 

   



                 [address] [address]     [phone]





                                         MEDICAL CENTER   





* Anti-Nuclear Antibody (MEGHAN) (10/25/2018  6:24 AM CDT)





   



                 Component       Value           Ref Range       Performed At

 

   



                 MEGHAN             Negative        Negative        [facility]













                                         Specimen

 





                                         Blood









 



                           Narrative                 Performed At

 

 



                           Test performed by IFA method.     [facility]



                           Test performed by IFA method.     [facility]









   



                 Performing Organization     Address         City/[facility] Cent[facility]     Phone Number

 

   



                 [address] [address]     [phone]   





* Vitamin B12 (10/25/2018  6:24 AM CDT)





   



                 Component       Value           Ref Range       Performed At

 

   



                 Vitamin B12     617             213 - 816 pg/mL     [facility]













                                         Specimen

 





                                         Blood









   



                 Performing Organization     Address         City/[facility] Cent[facility]     Phone Number

 

   



                 [address] [address]     [phone] [place] [address]   





* EBV Viral Load (10/25/2018  6:23 AM CDT)





   



                 Component       Value           Ref Range       Performed At

 

   



                     EBV Viral Load      Negative or below the linear      [facility]



                           range of the assay (<500      [facility]



                                         copies/mL)  













                                         Specimen

 





                                         Blood









 



                           Narrative                 Performed At

 

 



                                         This assay was performed by real-time PCR for the detection of the Stacy-Cuello

                                         [facility]



                           virus (EBV) gene EBNA-1.The test is composed of (1) DNA extraction from     [facility]



                                         patient specimen, and (2) real-time PCR amplification and detection with 



                                         CGRP-2-pcebxpel primers and probes. A well-conserved region of the EBNA-1 gene 





                                         is targeted, along with an internal control sequence used to confirm PCR 



                                         amplification. Asymptomatic carriers and viral genetic variation, among other 



                                         factors, can affect the accuracy of nucleic acid testing; therefore, results 



                                         should be interpreted in light of clinical data. 



                                         This test was developed and its performance characteristics determined by the 



                                         [facility] Pathology Department, Section of Molecular Pathology.

 



                                         It has not been cleared or approved by the U.S. Food and Drug Administration 



                                         (FDA), since FDA approval is not required for clinical use of the test. 



                                         Validation was done as required by The Clinical Laboratory Improvement 



                                         Amendments of 1988. 









   



                 Performing Organization     Address         City/[facility]/Zipcode     Phone Number

 

   



                 [facility]     [address] [place] [address]     [phone]





                                         [facility]   





* CMV PCR, quantitative (10/25/2018  6:23 AM CDT)





   



                 Component       Value           Ref Range       Performed At

 

   



                     CMV DNA Viral Load     Negative or below the linear      [facility]



                           range of the assay (<375      [facility]



                                         copies/mL)  













                                         Specimen

 





                                         Blood









 



                           Narrative                 Performed At

 

 



                           Cytomegalovirus (CMV) infection can cause significant disease in     [facility]



                           immunosuppressed patients. However, it is common for CMV to manifest as a     [facility]



                                         limited infection which is of no clinical significance in immunosuppressed 



                                         patients or in healthy individuals. 



                                         Viral load measurements are helpful to identify clinical CMV infection and to 



                                         guide the pre-emptive management of antiviral therapy.For treatment of CMV 





                                         infection due to reactivation in transplant recipients, a threshold between 



                                         4,000 and 5,000 copies/mL is suggested.For treatment of primary CMV 



                                         infection, a lower threshold can be used. 



                                         CMV infection may also be monitored using weekly serial measurements. Serial 



                                         measurements of CMV DNA viral load can be evaluated by identifying a 10-fold 



                                         change, as well as assessing the CMV DNA viral load and the clinical context for

 



                                         each patient. 



                                         The plasma CMV DNA viral load was detected using quantitative polymerase chain 





                                         reaction and fluorescent monitoring of a specific hybridized probe. Genetic 



                                         variation and other factors can affect the accuracy of nucleic acid testing. 



                                         Therefore, the results should be interpreted in light of clinical data. A 



                                         negative result may not exclude the presence of CMV disease. 



                                         This test was developed and its performance characteristics determined by the 



                                         [facility] Pathology Department, Section of Molecular Pathology.

 



                                         It has not been cleared or approved by the U.S. Food and Drug Administration 



                                         (FDA), since FDA approval is not required for clinical use of the test. 



                                         Validation was done as required by The Clinical Laboratory Improvement 



                                         Amendments of 1988. 









   



                 Performing Organization     Address         City/[facility]     Phone Number

 

   



                 [place] [id number] [place] [address]     [phone]





                                         [facility]ENTER   





* Hepatitis B surface antigen (10/25/2018  6:23 AM CDT)





   



                 Component       Value           Ref Range       Performed At

 

   



                 hepatitis B Surface Ag     NON-REACTIVE     Nonreactive     [facility] [place]













                                         Specimen

 





                                         Blood









   



                 Performing Organization     Address         City/State/Zipcode     Phone Number

 

   



                 [facility]     [address] [place] [address] [phone]





                                         [facility]   





* Transesophageal echo (10/25/2018 12:00 AM CDT)





   



                 Component       Value           Ref Range       Performed At

 

   



                           Ejection Fraction         [facility]



                                         MKCKESSON CPA









 



                           Narrative                 Performed At

 

 



                           Transesophageal Echocardiography Report (JAMES)     [facility]



                           Demographics              [facility]



                                         Patient Name BORIS PURI Date of Study 10/25/2018 



                                         NOEMI 



                                         [id number]

 



                                         Male 



                                         Visit Number 



                                         [id number] 



                                         [id number] Room Number 747 





                                         Number 



                                         Date of Birth1980Referring Physician Rom UHYNH 



                                         Age39 



                                         year(s)Sonographer Pramod Mansfield 



                                         Interpreting

 



                                         Eldon Bolaños 



                                         Physician

 



                                          MD 



                                         Fellow Soco TALLEY 



                                         Procedure 



                                         Type of 



                                         Study JAMES procedure:TRANSESOPHAGEAL ECHO 



                                         Indications:PFO . 



                                         Clinical History 



                                         CKD,ESRD,HTN 



                                         Height: 65 inches Weight: 73.48 kg (162 lbs) BSA: 1.81 m^2 BMI: 26.96 kg/m^2 



                                         HR: 92 bpm BP: 113/68 mmHg 



                                         Procedure Informed Consent 



                                         JAMES procedure notes 



                                         Moderate sedation by performing MD using 2 .5mg IV versed and 62.5mcg IV 



                                         fentanyl. . 



                                         Summary 



                                         IV saline contrast injection was negative for a PFO (patent foramen ovale) 



                                         at rest . 



                                         LA appendage morphology is simple (wind sock) . 



                                         No LA appendage Thrombus visualized. 



                                         LA is enlarged but severity assessment is unreliable due to known JAMES 



                                         sector size limitation. 



                                         Signature 



                                         ---------------------------------------------------------------- 



                                         Electronically signed by Eldon Bolaños MD(Interpreting 



                                         physician) on 10/30/2018 05:53 PM 



                                         ---------------------------------------------------------------- 



                                         Findings 



                                         Rhythm/BPRegular sinus rhythm during the exam. 



                                         Left Ventricle All of the LV segments have low normal contractility . 



                                         Global LV systolic function lower limits of 



                                         normal . 



                                         Left AtriumLA appendage morphology is simple (wind sock) . 



                                         No LA appendage Thrombus visualized. 



                                         LA is enlarged but severity assessment is 



                                         unreliable due to 



                                         known JAMES sector size limitation. 



                                         RightNormal RV size and systolic function. 



                                         Ventricle 



                                         Right Atrium RA size is mildly dilated. 



                                         Atrial SeptumIV saline contrast injection was negative for a PFO (patent 



                                         foramen ovale) at rest . 



                                         Aortic Valve Normal AoV structure and function. 



                                         Mitral Valve Mild MV leaflet thickening. 



                                         Moderate mitral regurgitation. 



                                         The mechanism for MR is leaflet tethering . 



                                         TricuspidNormal TV structure and function. 



                                         ValveMild tricuspid regurgitation. 



                                         Estimated peak systolic PA pressure is 30-35 



                                         mmHg + RA 



                                         pressure. 



                                         Pulmonic Valve Normal PV structure and function. 



                                         AortaAortic root size (SInus of Valsalva diameter) is 



                                         normal . 



                                         There is no evidence of aortic plaque. 



                                         PericardiumA small pericardial effusion is present . 



                                         The pericardial effusion appears circumferential

 



                                         . 









                                        Procedure Note

 

                                        



Interface, External Ris In - 10/30/2018  5:54 PM CDT



Transesophageal Echocardiography Report (JAMES)

 

 Demographics

 

 Patient Name   BORIS PURI     Date of Study       10/25/2018

                NOEMI

 

 MRN            [id number]          Gender              Male

 

 Visit Number   [id number]        Race                Unknown

 

 Accession      [id number]         Room Number         747

 Number

 

 Date of Birth  1980        Referring Physician Rom HUYNH

 

 Age            38 year(s)        Sonographer         Pramod Mansfield

 

                                  Interpreting        Eldon Bolaños,

                                  Physician           MD

 

 Fellow         Soco TALLEY

 

Procedure



 Type of

 Study     JAMES procedure:TRANSESOPHAGEAL ECHO

 

Indications:PFO .



Clinical History

CKD,ESRD,HTN



Height: 65 inches Weight: 73.48 kg (162 lbs) BSA: 1.81 m^2 BMI: 26.96 kg/m^2



HR: 92 bpm BP: 113/68 mmHg



 Procedure Informed Consent

 

 JAMES procedure notes

 Moderate sedation by performing MD using 2 .5mg IV versed and 62.5mcg IV

 fentanyl. .

 

 Summary

 IV saline contrast injection was negative for a PFO (patent foramen ovale)

 at rest .

 LA appendage morphology is simple (wind sock) .

 No LA appendage Thrombus visualized.

 LA is enlarged but severity assessment is unreliable due to known JAMES

 sector size limitation.

 

 Signature

 

 ----------------------------------------------------------------

 Electronically signed by Eldon Bolaños MD(Interpreting

 physician) on 10/30/2018 05:53 PM

 ----------------------------------------------------------------

 

 Findings

 

 Rhythm/BP      Regular sinus rhythm during the exam.

 

 Left Ventricle All of the LV segments have low normal contractility .

                Global LV systolic function lower limits of normal .

 

 Left Atrium    LA appendage morphology is simple (wind sock) .

                No LA appendage Thrombus visualized.

                LA is enlarged but severity assessment is unreliable due to

                known JAMES sector size limitation.

 

 Right          Normal RV size and systolic function.

 Ventricle

 

 Right Atrium   RA size is mildly dilated.

 

 Atrial Septum  IV saline contrast injection was negative for a PFO (patent

                foramen ovale) at rest .

 

 Aortic Valve   Normal AoV structure and function.

 

 Mitral Valve   Mild MV leaflet thickening.

                Moderate mitral regurgitation.

                The mechanism for MR is leaflet tethering .

 

 Tricuspid      Normal TV structure and function.

 Valve          Mild tricuspid regurgitation.

                Estimated peak systolic PA pressure is 30-35 mmHg + RA

                pressure.

 

 Pulmonic Valve Normal PV structure and function.

 

 Aorta          Aortic root size (SInus of Valsalva diameter) is normal .

                There is no evidence of aortic plaque.

 

 Pericardium    A small pericardial effusion is present .

                The pericardial effusion appears circumferential .

 









   



                 Performing Organization     Address         City/State/Zipcode     Phone Number

 

   



                                         [facility]   



                                         [facility]   





* CT chest without IV contrast (10/24/2018  3:24 AM CDT)





 



                           Narrative                 Performed At

 

 



                           FINAL REPORT              [employer]



                                         PATIENT ID: [id number] 



                                         CLINICAL INDICATION: Dyspnea, lung nodules in the lung bases on CT 



                                         abdomen performed 10/23/2018 



                                         COMPARISON: None 



                                         Multiple axial images of the chest were performed without IV contrast. 



                                         This exam was performed according to our departmental 



                                         dose-optimization program, which includes automated exposure control, 



                                         adjustment of the mA and/or kV according to patient size and/or use 



                                         of the iterative reconstruction technique. 



                                         FINDINGS: 



                                         Lung parenchyma: There are several semisolid nodules in both lungs. 



                                         The largest is in the left lower lobe and measures 1.1 x 0.6 cm. A 



                                         right lower lobe nodule measures 0.8 x 0.4 cm. A left lower lobe 



                                         nodule measures 0.9 x 0.6 cm. Several smaller semisolid nodules are 



                                         present elsewhere in the mid and upper lungs. 



                                         Curvilinear opacity in the left lower lobe, lingula and left upper 



                                         lobe suggests scarring or atelectasis. 



                                         Pleural effusion: Trace bilateral effusions 



                                         Pneumothorax: None. 



                                         Tracheobronchial tree: No significant findings. 



                                         Pulmonary vasculature: No significant findings. 



                                         Cardiac contours and great vessels: Cardiomegaly 



                                         Mediastinum: Trace pericardial effusion 



                                         Lymph Nodes: No adenopathy in the mediastinum or more. 



                                         Skeleton: No acute abnormality. 



                                         Limited images of upper abdomen: Upper abdominal ascites 



                                         IMPRESSION: 



                                         Several semisolid nodules in both lungs. The largest is in the left 



                                         lower lobe and measures 1.1 x 0.6 cm. Differential diagnostic 



                                         considerations include infectious, inflammatory and neoplastic 



                                         processes. Please see below for published recommendations for 



                                         follow-up. 



                                         Cardiomegaly, trace bilateral pleural effusions, trace pericardial 



                                         effusion and ascites. 



                                         2017 Fleischner Society Recommendations for Subsolid Lung Nodule 



                                         Follow-Up based on size (average of long- and short-axis diameters). 



                                         Use most suspicious nodule for followup. 



                                         Single

 



                                          



                                         <6 mm Ground glass: No routine follow-up 



                                         <6 mm Part solid: No routine follow-up 



                                         > or=6 mm Ground glass: CT at 6-12 months to confirm persistence, 



                                         then CT every 2 years until 5 years 



                                         > or=6 mm Part solid: CT at 3-6 months to confirm persistence, If 



                                         unchanged and solid component remains<6mm, annual CT should be 



                                         performed for 5 years. 



                                         Multiple 



                                         < 6mm: CT at 3-6 months.If stable consider CT at 2 and 4 years. 



                                         > or=6mm: CT at 3-6 months. Subsequent management based on the most 



                                         suspicious nodule(s). 



                                         Signed: Ramy Galvin MD 



                                         Report Verified Date/Time:10/24/2018 03:35:26 



                                         Reading Location: [address] Reading Room 



                                         Electronically signed by: RAMY GALVIN M.D. on 10/24/2018 03:35 





                                         AM 









                                        Procedure Note

 

                                        



Interface, External Ris In - 10/24/2018  3:37 AM CDT



FINAL REPORT

 

PATIENT ID:   [id number]

 

 

CLINICAL INDICATION: Dyspnea, lung nodules in the lung bases on CT 

abdomen performed 10/23/2018

 

COMPARISON: None

 

Multiple axial images of the chest were performed without IV contrast.

 

This exam was performed according to our departmental 

dose-optimization program, which includes automated exposure control, 

adjustment of the mA and/or kV according to patient size and/or use 

of the iterative reconstruction technique.

 

FINDINGS:

 

Lung parenchyma: There are several semisolid nodules in both lungs. 

The largest is in the left lower lobe and measures 1.1 x 0.6 cm. A 

right lower lobe nodule measures 0.8 x 0.4 cm. A left lower lobe 

nodule measures 0.9 x 0.6 cm. Several smaller semisolid nodules are 

present elsewhere in the mid and upper lungs.

 

Curvilinear opacity in the left lower lobe, lingula and left upper 

lobe suggests scarring or atelectasis.

 

Pleural effusion: Trace bilateral effusions

 

Pneumothorax: None.

 

Tracheobronchial tree: No significant findings.

 

Pulmonary vasculature: No significant findings.

 

Cardiac contours and great vessels: Cardiomegaly

 

Mediastinum: Trace pericardial effusion

 

Lymph Nodes: No adenopathy in the mediastinum or more.

 

Skeleton: No acute abnormality. 

 

Limited images of upper abdomen: Upper abdominal ascites

 

IMPRESSION:

 

Several semisolid nodules in both lungs. The largest is in the left 

lower lobe and measures 1.1 x 0.6 cm. Differential diagnostic 

considerations include infectious, inflammatory and neoplastic 

processes. Please see below for published recommendations for 

follow-up.

 

Cardiomegaly, trace bilateral pleural effusions, trace pericardial 

effusion and ascites.

 

                                        2017 Fleischner Society Recommendations for Subsolid Lung Nodule 

Follow-Up based on size (average of long- and short-axis diameters). 

Use most suspicious nodule for followup.

 

Single                                        

<6 mm Ground glass: No routine follow-up 

<6 mm Part solid: No routine follow-up 

 

> or=6 mm Ground glass: CT at 6-12 months to confirm persistence, 

then CT every 2 years until 5 years

> or=6 mm Part solid: CT at 3-6 months to confirm persistence, If 

unchanged and solid component remains<6mm, annual CT should be 

performed for 5 years.

 

Multiple

< 6mm: CT at 3-6 months.  If stable consider CT at 2 and 4 years. 

> or=6mm: CT at 3-6 months. Subsequent management based on the most 

suspicious nodule(s). 

 

Signed: Ramy Galvin MD

Report Verified Date/Time:  10/24/2018 03:35:26

 

Reading Location: [address] Reading Room

      Electronically signed by: RAMY GALVIN M.D. on 10/24/2018 03:35 AM

 









   



                 Performing Organization     Address         City/State/Zipcode     Phone Number

 

   



                                         [facility] [facility]   





* CT abdomen/pelvis with IV contrast (10/23/2018  5:50 PM CDT)





 



                           Narrative                 Performed At

 

 



                           FINAL REPORT              [facility] [facility]



                                         PATIENT ID: [id number] 



                                         HISTORY : Abdominal pain, unspecified 



                                         NAUSEA 



                                         EMESIS 



                                         Technique: Multiple axial images of the abdomen and pelvis were 



                                         performed with the administration of IV and oral contrast from the 



                                         lung bases to the pubic symphysis. Delayed images were also obtained. 



                                         This exam was performed according to our departmental dose 



                                         optimization program which includes automated exposure control, 



                                         adjustment of the mA and/or kV according to patient size and/or use 



                                         of iterative reconstructive technique. 



                                         COMPARISON : None 



                                         COMMENT : 



                                         There are trace bilateral pleural effusions. Adjacent consolidation 



                                         most likely represents atelectasis. There are several small nodular 



                                         lesion/foci in the lung bases. For example, in the left lower lobe, 



                                         there is a 0.8 cm nodular density. An additional nodular density is 



                                         also seen in the right lung base. These nodular foci are nonspecific. 



                                         While these may be related to an infectious/inflammatory process, 



                                         neoplastic/metastatic process cannot be excluded. Further evaluation 



                                         with a dedicated chest CT is advised. 



                                         The visualized spleen, adrenal glands, contracted gallbladder, 



                                         pancreas, stomach and duodenum are within normal limits. 



                                         The kidneys are atrophic. 



                                         There is hepatomegaly. 



                                         There is reflux of contrast into the inferior vena cava and hepatic 



                                         veins. The findings could be due to right-sided cardiac 



                                         dysfunction/failure. There is cardiomegaly. There is a small amount 



                                         of pericardial fluid/effusion. 



                                         There is no abdominal, retroperitoneal or pelvic lymphadenopathy. 



                                         There is some nonspecific infiltration of the subcutaneous fat around 



                                         the umbilicus of unclear etiology/significance. This appears to 



                                         extend intra-abdominally. No organized fluid collections are 



                                         definitively seen. Findings could be due to some localized 



                                         cellulitis/inflammation. The findings can be closely followed by CT 



                                         to exclude the possibility of a mass. 



                                         No free air is identified in the abdomen or pelvis. There is a small 



                                         amount of nonspecific free fluid in the abdomen and pelvis. No 



                                         findings of any bowel obstruction. 



                                         The small bowel is within normal limits. There is some long segment 



                                         wall thickening identified of the colon extending from the distal 



                                         transverse colon to the sigmoid colon. While the wall thickening 



                                         could be due to underdistention, a degree of nonspecific inflammatory 



                                         versus infectious versus ischemic colitis cannot be entirely 



                                         excluded, however. The appendix is questionably visualized. However, 



                                         there are no definite CT findings to suggest appendicitis at this 



                                         time. 



                                         The prostate gland is enlarged. The seminal vesicles are within 



                                         normal limits. 



                                         Impression: 



                                         1. Hepatomegaly. 



                                         2. Cardiomegaly with findings suggestive of right-sided cardiac 



                                         dysfunction/failure. 



                                         3. Small amount of abdominal ascites/free fluid. 



                                         4. Atrophic kidneys. 



                                         5. Several nodular foci in the lung bases. Correlation with a 



                                         dedicated chest CT is advised. 



                                         6. Nonspecific long segment wall thickening of the left colon, 



                                         underdistention versus nonspecific colitis. 



                                         7. Nonspecific edema/inflammation in the subcutaneous fat/soft 



                                         tissues around the umbilicus with some questionable extension 



                                         intra-abdominally. No organized fluid collections are seen. 



                                         Signed: Dominick Garcia MD 



                                         Report Verified Date/Time:10/23/2018 18:09:26 



                                         Reading Location: [facility]013Y CT Body Reading Room 



                                          Electronically signed by: DOMINICK GARCIA M.D. on 10/23/2018 06:09 PM

 









                                        Procedure Note

 

                                        



Interface, External Ris In - 10/23/2018  6:11 PM CDT



FINAL REPORT

 

PATIENT ID:   [id number]

 

HISTORY : Abdominal pain, unspecified

NAUSEA

EMESIS

 

Technique: Multiple axial images of the abdomen and pelvis were 

performed with the administration of IV and oral contrast from the 

lung bases to the pubic symphysis. Delayed images were also obtained. 

This exam was performed according to our departmental dose 

optimization program which includes automated exposure control, 

adjustment of the mA and/or kV according to patient size and/or use 

of iterative reconstructive technique.

 

COMPARISON : None

 

COMMENT :

 

There are trace bilateral pleural effusions. Adjacent consolidation 

most likely represents atelectasis. There are several small nodular 

lesion/foci in the lung bases. For example, in the left lower lobe, 

there is a 0.8 cm nodular density. An additional nodular density is 

also seen in the right lung base. These nodular foci are nonspecific. 

While these may be related to an infectious/inflammatory process, 

neoplastic/metastatic process cannot be excluded. Further evaluation 

with a dedicated chest CT is advised.

 

The visualized spleen, adrenal glands, contracted gallbladder, 

pancreas, stomach and duodenum are within normal limits.

 

The kidneys are atrophic.

 

There is hepatomegaly.

 

There is reflux of contrast into the inferior vena cava and hepatic 

veins. The findings could be due to right-sided cardiac 

dysfunction/failure. There is cardiomegaly. There is a small amount 

of pericardial fluid/effusion.

 

There is no abdominal, retroperitoneal or pelvic lymphadenopathy.

 

There is some nonspecific infiltration of the subcutaneous fat around 

the umbilicus of unclear etiology/significance. This appears to 

extend intra-abdominally. No organized fluid collections are 

definitively seen. Findings could be due to some localized 

cellulitis/inflammation. The findings can be closely followed by CT 

to exclude the possibility of a mass.

 

No free air is identified in the abdomen or pelvis. There is a small 

amount of nonspecific free fluid in the abdomen and pelvis. No 

findings of any bowel obstruction.

 

The small bowel is within normal limits. There is some long segment 

wall thickening identified of the colon extending from the distal 

transverse colon to the sigmoid colon. While the wall thickening 

could be due to underdistention, a degree of nonspecific inflammatory 

versus infectious versus ischemic colitis cannot be entirely 

excluded, however. The appendix is questionably visualized. However, 

there are no definite CT findings to suggest appendicitis at this 

time. 

 

The prostate gland is enlarged. The seminal vesicles are within 

normal limits.

 

Impression:

 

                                        1. Hepatomegaly.

 

                                        2. Cardiomegaly with findings suggestive of right-sided cardiac 

dysfunction/failure.

 

                                        3. Small amount of abdominal ascites/free fluid.

 

                                        4. Atrophic kidneys.

 

                                        5. Several nodular foci in the lung bases. Correlation with a 

dedicated chest CT is advised.

 

                                        6. Nonspecific long segment wall thickening of the left colon, 

underdistention versus nonspecific colitis.

 

                                        7. Nonspecific edema/inflammation in the subcutaneous fat/soft 

tissues around the umbilicus with some questionable extension 

intra-abdominally. No organized fluid collections are seen.

 

Signed: Dominick Garcia MD

Report Verified Date/Time:  10/23/2018 18:09:26

 

Reading Location: [facility]013[facility] CT Body Reading Room

       Electronically signed by: DOMINICK GARCIA M.D. on 10/23/2018 06:09 PM

 









   



                 Performing Organization     Address         City/State/Zipcode     Phone Number

 

   



                                         [facility]   





* Lipase (10/23/2018  1:52 PM CDT)





   



                 Component       Value           Ref Range       Performed At

 

   



                 Lipase          10              8 - 78 U/L      [facility]













                                         Specimen

 





                                         Blood - Arm, Left









   



                 Performing Organization     Address         City/[facility]     Phone Number

 

   



                 [address] [id number] [phone]   





* Amylase (10/23/2018  1:52 PM CDT)





   



                 Component       Value           Ref Range       Performed At

 

   



                 Amylase         77              25 - 125 U/L     [facility]













                                         Specimen

 





                                         Blood - Arm, Left









   



                 Performing Organization     Address         City/[facility]     Phone Number

 

   



                 [address] [address]     [phone]   





* Hepatic function panel (10/23/2018  1:52 PM CDT)





   



                 Component       Value           Ref Range       Performed At

 

   



                 Protein, Total     8.6 (H)         6.0 - 8.3 gm/dL     [facility]

 

   



                 Albumin         4.4             3.5 - 5.0 g/dL     [facility]

 

   



                 Total Bilirubin     1.2             0.2 - 1.2 mg/dL     [facility]

 

   



                 Bilirubin, Direct     0.8 (H)         0.1 - 0.5 mg/dL     [facility]

 

   



                 Alkaline Phosphatase     167 (H)         40 - 150 U/L     [facility]

 

   



                 AST             20              5 - 34 U/L      [facility]

 

   



                 ALT             11              6 - 55 U/L      [facility]













                                         Specimen

 





                                         Blood - Arm, Left









   



                 Performing Organization     Address         City/[facility]     Phone Number

 

   



                 [place] [address]     [phone]   





* ECHOCARDIOGRAM REPORT - SCAN (10/19/2018  3:23 PM CDT)





 



                           Narrative                 Performed At

 

 



                                         This result has an attachment that is not available. 





* US abdomen complete (10/19/2018  3:08 PM CDT)





 



                           Narrative                 Performed At

 

 



                           FINAL REPORT              [employer]



                                         PATIENT ID: [id number] 



                                         Ultrasound of the Abdomen, 10/19/2018. 



                                         Clinical History:Abdominal pain, ascites. 



                                         Comparison: 10/28/2014. 



                                         Discussion: 



                                         Sonographic evaluation of the abdomen is performed. 



                                         Liver: 19.4 cm in length at the right midclavicular line, enlarged. 



                                         Normal echogenicity.Homogeneous echotexture.No mass.Main portal 



                                         vein diameter 1.6 cm. 



                                         Biliary tree:Common duct 4 mm.No biliary dilatation. 



                                         Gallbladder:No gallstones. Mild diffuse wall thickening. 2 mm 



                                         polyp.No pericholecystic fluid.Absent sonographic Murray sign. 



                                         Pancreas: Head, body, and proximal tail unremarkable. 



                                         Ascites:Trace ascites present. 



                                         Spleen:14.4 cm in length, mildly enlarged. 



                                         Kidneys: Right kidney 8.9 cm in length, small in size, with 



                                         cortical thickness of 0.7 cm.Left kidney 8.4 cm in length, normal 



                                         in size, with cortical thickness of 1.1 cm.Increased cortical 



                                         echogenicity.No mass.No shadowing calculus.No hydronephrosis. 



                                         IVC/Aorta:Segments partially seen.Unremarkable. 



                                         Impression: 



                                         1. Hepatosplenomegaly. 



                                         2. Trace ascites. 



                                         3. 2 mm gallbladder polyp, of doubtful clinical significance. 



                                         4. Mild gallbladder wall thickening without convincing evidence for 



                                         cholecystitis. Findings may reflect hypoproteinemia. 



                                         5. Appearance of kidneys compatible with chronic medical renal 



                                         disease. 



                                         Signed: Ara Webster MD 



                                         Report Verified Date/Time:10/19/2018 17:09:45 



                                         Reading Location: [address] Radiology Reading Room 



                                         Electronically signed by: ARA WEBSTER M.D. on 10/19/2018 05:09 





                                         PM 









                                        Procedure Note

 

                                        



Interface, External Ris In - 10/19/2018  5:11 PM CDT



FINAL REPORT

 

PATIENT ID:   [id number]

 

 

Ultrasound of the Abdomen, 10/19/2018.

 

Clinical History:  Abdominal pain, ascites.

 

Comparison: 10/28/2014.

 

Discussion:

Sonographic evaluation of the abdomen is performed.  

 

Liver:   19.4 cm in length at the right midclavicular line, enlarged.  

Normal echogenicity.  Homogeneous echotexture.  No mass.  Main portal 

vein diameter 1.6 cm.

 

 

Biliary tree:  Common duct 4 mm.  No biliary dilatation.

 

Gallbladder:  No gallstones. Mild diffuse wall thickening. 2 mm 

polyp.  No pericholecystic fluid.  Absent sonographic Murray sign.

 

Pancreas: Head, body, and proximal tail unremarkable.  

 

Ascites:  Trace ascites present.

 

Spleen:  14.4 cm in length, mildly enlarged.  

 

Kidneys:   Right kidney 8.9 cm in length, small in size, with 

cortical thickness of 0.7 cm.  Left kidney 8.4 cm in length, normal 

in size, with cortical thickness of 1.1 cm.  Increased cortical 

echogenicity.  No mass.  No shadowing calculus.  No hydronephrosis.

 

IVC/Aorta:  Segments partially seen.  Unremarkable.

 

Impression:

                                        1. Hepatosplenomegaly.

                                        2. Trace ascites.

                                        3. 2 mm gallbladder polyp, of doubtful clinical significance.

                                        4. Mild gallbladder wall thickening without convincing evidence for 

cholecystitis. Findings may reflect hypoproteinemia.

                                        5. Appearance of kidneys compatible with chronic medical renal 

disease.

 

Signed: Ara Webster MD

Report Verified Date/Time:  10/19/2018 17:09:45

 

Reading Location: [address] Radiology Reading Room

  Electronically signed by: ARA WEBSTER M.D. on 10/19/2018 05:09 PM

 









   



                 Performing Organization     Address         City/State/Zipcode     Phone Number

 

   



                                         [facility]   





* 2D Echo W/Doppler(CW/PW/Color) (10/19/2018  1:38 PM CDT)





   



                 Component       Value           Ref Range       Performed At

 

   



                           Ejection Fraction         [facility]



                                         [facility]









 



                           Narrative                 Performed At

 

 



                           Transthoracic Echocardiography Report (TTE)     [facility]



                           Demographics              [facility]



                                         Patient Name BORIS PURI Date of Study 10/19/2018 



                                         NOEMI 



                                         [id number]

 



                                         Male 



                                         Visit Number 



                                         [id number] 



                                         [id number] Room Number OP 



                                         Number 



                                         Date of Birth1980Referring Physician Ric SCOTT 



                                         Age39 



                                         year(s)Sonographer Ladan Arredondo, 



                                         

 



                                         CS 



                                         Interpreting

 



                                         Dilip Marie 



                                         Physician

 



                                          MD 



                                         Procedure 



                                         Type of 



                                         Study TTE procedure:2DECHO W DOPPLER(CW/PW/COLOR) (Routine) 



                                         Indications:Pre-surgical evaluation of organ transplant. 



                                         Clinical History 



                                         ESRD, HTN 



                                         Height: 65.5 inches Weight: 70.76 kg (156 lbs) BSA: 1.79 m^2 BMI: 25.56 



                                         kg/m^2 



                                         HR: 71 bpm BP: 147/89 mmHg 



                                         Summary 



                                         The left ventricle is chamber size (by vol index) is moderately enlarged 



                                         (male - LVED vol 90-100ml.m2) . Mild concentric LV hypertrophy. All of the 



                                         LV segments have low normal contractility . LVEF by Trujillo's method of 



                                         disk assessment is lower limits of normal (50-55%) . 



                                         Degree of diastolic dysfunction (LAP assessment) is inconclusive due to 



                                         significant MR . 



                                         RV chamber size is mildly enlarged . Global RV systolic function is mildly 



                                         reduced . 



                                         Moderate mitral regurgitation. 



                                         Mild to moderate tricuspid regurgitation. 



                                         Estimated peak systolic PA pressure is 45-50 mmHg . 



                                         A circumferential pericardial effusion is present . It is trivial to small 



                                         posteriorly and anteriorly and moderate ( 15mm) around the RA. 



                                         Pulmonary vein flow is consistent with increased LAP . 



                                         The estimated RA pressure by IVC dynamics 16-20mmHg . 



                                         Signature 



                                         ---------------------------------------------------------------- 



                                         Electronically signed by Dilip Marie MD(Interpreting 



                                         physician) on 10/19/2018 02:38 PM 



                                         ---------------------------------------------------------------- 



                                         Findings 



                                         Technical Quality: Technically adequate exam. 



                                         Left Ventricle The left ventricle is chamber size (by vol 



                                         index) 



                                         is moderately enlarged (male - 





                                         LVED vol 



                                         90-100ml.m2) . 



                                         Mild concentric LV hypertrophy.

 



                                         All of the LV segments have low

 



                                         normal 



                                         contractility . 



                                         LVEF by Rtujillo's method of disk

 



                                         assessment is 



                                         lower limits of normal (50-55%)

 



                                         . 



                                         Degree of diastolic dysfunction

 



                                         (LAP assessment) is 



                                         inconclusive due to significant

 



                                         MR . 



                                         Left AtriumLA size is severely enlarged (>48 ml/m2) . 



                                         Right VentricleRV chamber size is mildly enlarged . 



                                         Global RV systolic function is 





                                         mildly reduced . 



                                         Right Atrium RA size is severely dilated. 



                                         Aortic Valve Normal AoV structure. 



                                         There is trace aortic 



                                         regurgitation. 



                                         Mitral Valve Normal MV structure. 



                                         Mild MV leaflet thickening. 



                                         Moderate mitral regurgitation. 





                                         Tricuspid ValveTV structure is normal. 



                                         Mild to moderate tricuspid 



                                         regurgitation. 



                                         Estimated peak systolic PA 



                                         pressure is 45-50 mmHg . 



                                         Pulmonic Valve Normal PV structure. 



                                         A trace of, Mild pulmonary 



                                         regurgitation. 



                                         AortaAortic root size (SInus of Valsalva 



                                         diameter) is 



                                         normal . 



                                         PericardiumA circumferential pericardial effusion is 



                                         present . 



                                         It is trivial to small 



                                         posteriorly and anteriorly 



                                         and moderate ( 15mm) around the

 



                                         RA. 



                                         IVC/SVC/PA/PV/PleuralPulmonary vein flow is consistent with increased 



                                         LAP . 



                                         The estimated RA pressure by IVC

 



                                         dynamics 16-20mmHg 



                                         . 



                                         Chambers/Structures 



                                         Left Atrium 



                                         LA Dimension: 5.85 cmLA Area: 33.57 cm^2 



                                         LA Volume: 125.43 ml 



                                         LA Vol. Index: 70 ml/m^2 



                                         Left Ventricle 



                                         LVIDd: 5.26 cm 



                                         LVIDs: 4.03 cm 



                                         LV Septum Diastolic: 1.32 cm 



                                         LV PW Diastolic: 1.35 cmLV FS: 23.4 % 



                                         LVEDV Trujillo's:168.44 ml 



                                         LVESV Trujillo's:81.39 mlLVEDVI: 94 



                                         ml/m^2 



                                         LVEF Trujillo's: 51.7 %LVESVI: 45 



                                         ml/m^2 



                                         LVOT Diameter: 2.03 cm 



                                         Aorta 



                                         Ao Root S of Jennie.: 2.91 cm 



                                         Doppler/Quantitative Measurements 



                                         LVOT 



                                         Peak Velocity: 1.16 m/s Peak Gradient: 5.42 mmHg 



                                         Mean Velocity: 0.74 m/s Mean Gradient: 2.55 mmHg 



                                         LVOT Diameter: 2.03 cmLVOT VTI: 21.46 cm 



                                         LVOT Area: 3.24 cm^2LVOT SV:69.42 ml 



                                         LVOT CO: 4.93 l/min LVOT CI: 2.75 l/min/m^2 









                                        Procedure Note

 

                                        



Interface, External Ris In - 10/19/2018  2:38 PM CDT



Transthoracic Echocardiography Report (TTE)



 Demographics

 

 Patient Name   BORIS PURI     Date of Study       10/19/2018

                NOEMI

 

 MRN            [id number]          Gender              Male

 

 Visit Number   [id number]        Race                Unknown

 

 Accession      [id number]         Room Number         OP

 Number

 

 Date of Birth  1980        Referring Physician Ric SCOTT

 

 Age            38 year(s)        Sonographer         Ladan Arredondo,

                                                      JONEL

 

                                  Interpreting        Dilip Marie

                                  Physician           MD

 

Procedure



 Type of

 Study     TTE procedure:2DECHO W DOPPLER(CW/PW/COLOR) (Routine)

 

Indications:Pre-surgical evaluation of organ transplant.



Clinical History

ESRD, HTN



Height: 65.5 inches Weight: 70.76 kg (156 lbs) BSA: 1.79 m^2 BMI: 25.56

kg/m^2



HR: 71 bpm BP: 147/89 mmHg



 Summary

 The left ventricle is chamber size (by vol index) is moderately enlarged

 (male - LVED vol 90-100ml.m2) . Mild concentric LV hypertrophy. All of the

 LV segments have low normal contractility . LVEF by Trujillo's method of

 disk assessment is lower limits of normal (50-55%) .

 

 Degree of diastolic dysfunction (LAP assessment) is inconclusive due to

 significant MR .

 

 RV chamber size is mildly enlarged . Global RV systolic function is mildly

 reduced .

 

 Moderate mitral regurgitation.

 

 Mild to moderate tricuspid regurgitation.

 

 Estimated peak systolic PA pressure is 45-50 mmHg .

 

 A circumferential pericardial effusion is present . It is trivial to small

 posteriorly and anteriorly and moderate ( 15mm) around the RA.

 

 Pulmonary vein flow is consistent with increased LAP .

 

 The estimated RA pressure by IVC dynamics 16-20mmHg .

 

 Signature

 

 ----------------------------------------------------------------

 Electronically signed by Dilip Marie MD(Interpreting

 physician) on 10/19/2018 02:38 PM

 ----------------------------------------------------------------

 

 Findings

 

Technical Quality: Technically adequate exam.



 Left Ventricle         The left ventricle is chamber size (by vol index)

                        is moderately enlarged (male - LVED vol

                        90-100ml.m2) .

                        Mild concentric LV hypertrophy.

                        All of the LV segments have low normal

                        contractility .

                        LVEF by Trujillo's method of disk assessment is

                        lower limits of normal (50-55%) .

                        Degree of diastolic dysfunction (LAP assessment) is

                        inconclusive due to significant MR .

 

 Left Atrium            LA size is severely enlarged (>48 ml/m2) .

 

 Right Ventricle        RV chamber size is mildly enlarged .

                        Global RV systolic function is mildly reduced .

 

 Right Atrium           RA size is severely dilated.

 

 Aortic Valve           Normal AoV structure.

                        There is trace aortic regurgitation.

 

 Mitral Valve           Normal MV structure.

                        Mild MV leaflet thickening.

                        Moderate mitral regurgitation.

 

 Tricuspid Valve        TV structure is normal.

                        Mild to moderate tricuspid regurgitation.

                        Estimated peak systolic PA pressure is 45-50 mmHg .

 

 Pulmonic Valve         Normal PV structure.

                        A trace of, Mild pulmonary regurgitation.

 

 Aorta                  Aortic root size (SInus of Valsalva diameter) is

                        normal .

 

 Pericardium            A circumferential pericardial effusion is present .

                        It is trivial to small posteriorly and anteriorly

                        and moderate ( 15mm) around the RA.

 

 IVC/SVC/PA/PV/Pleural  Pulmonary vein flow is consistent with increased

                        LAP .

                        The estimated RA pressure by IVC dynamics 16-20mmHg

                        .

 

Chambers/Structures



 Left Atrium

 

 LA Dimension: 5.85 cm                  LA Area: 33.57 cm^2

 LA Volume: 125.43 ml

 LA Vol. Index: 70 ml/m^2

 

 Left Ventricle

 

 LVIDd: 5.26 cm

 LVIDs: 4.03 cm

 LV Septum Diastolic: 1.32 cm

 LV PW Diastolic: 1.35 cm                    LV FS: 23.4 %

 LVEDV Trujillo's:168.44 ml

 LVESV Trujillo's:81.39 ml                    LVEDVI: 94 ml/m^2

 LVEF Trujillo's: 51.7 %                      LVESVI: 45 ml/m^2

 

 LVOT Diameter: 2.03 cm

 

Aorta

 

 Ao Root S of Jennie.: 2.91 cm

 

Doppler/Quantitative Measurements



 LVOT

 

 Peak Velocity: 1.16 m/s             Peak Gradient: 5.42 mmHg

 Mean Velocity: 0.74 m/s             Mean Gradient: 2.55 mmHg

 LVOT Diameter: 2.03 cm              LVOT VTI: 21.46 cm

 LVOT Area: 3.24 cm^2                LVOT SV:69.42 ml

 LVOT CO: 4.93 l/min                 LVOT CI: 2.75 l/min/m^2

 









   



                 Performing Organization     Address         City/[facility]     Phone Number

 

   



                                         [facility]   



                                         [facility]   





* FLOW PRA CLASS II WITH REFLEX TO ANTIBODY SPECIFICITY (2018 10:34 AM 
  CDT)



Only the most recent of 3 results within the time period is included.





   



                 Component       Value           Ref Range       Performed At

 

   



                     Flow Class II Percent     0                   [facility]



                                         Positive   

 

   



                           Flow Class Report         [facility]



                                         Comments   













                                         Specimen

 





                                         Blood









 



                           Narrative                 Performed At

 

 



                           Disclaimer:               [facility]



                                         This test was developed and its performance characteristics determined by the 



                                         [facility]. It has not been cleared or approved by the U.S. Food and Drug 



                                         Administration. The FDA has determined that such clearance or approval is not 



                                         necessary. This test is used for clinical purposes. It should not be regarded as

 



                                         investigational or for research. This laboratory is certified under the Clinical

 



                                         Laboratory Improvement Amendments of 1988 (CLIA-88) as qualified to perform high

 



                                         complexity clinical laboratory testing. 









   



                 Performing Organization     Address         City/State/[facility]     Phone Number

 

   



                     [facility]     [address]:  [id number],     [place] [address] 



                                         CLIA#[id number] CAP#[id number]  



                                         UNOS#[id number]  





* FLOW PRA CLASS I WITH REFLEX TO ANTIBODY SPECIFICITY (2018 10:34 AM CDT)



Only the most recent of 3 results within the time period is included.





   



                 Component       Value           Ref Range       Performed At

 

   



                     Flow Class I Percent     0                   [facility]



                                         Positive   

 

   



                           Flow Class Report         [facility]



                                         Comments   













                                         Specimen

 





                                         Blood









 



                           Narrative                 Performed At

 

 



                           Disclaimer:               [facility]



                                         This test was developed and its performance characteristics determined by the 



                                         [facility]. It has not been cleared or approved by the U.S. Food and Drug 



                                         Administration. The FDA has determined that such clearance or approval is not 



                                         necessary. This test is used for clinical purposes. It should not be regarded as

 



                                         investigational or for research. This laboratory is certified under the Clinical

 



                                         Laboratory Improvement Amendments of 1988 (CLIA-88) as qualified to perform high

 



                                         complexity clinical laboratory testing. 









   



                 Performing Organization     Address         City/State/Zipcode     Phone Number

 

   



                     [facility]     [address]:  [id number],     [place] [address] 



                                         CLIA#[id number] CAP#[id number]  



                                         UNOS#[id number]  





after 2017



Insurance







     



            Payer      Benefit     Subscriber ID     Type       Phone      Address



                                         Plan /    



                                         Group    

 

     



            BLUE CROSS/BLUE SHIELD     [employer]     xxxxxxxxxxxxxxx     PPO        [phone]     [id number]



                           POS/[place] [address]



                                         O    

 

     



                 [employer] - [employer]     [employer] HMO       xxxxxxxxx       HMO/POS  



                                         POS QPOS    









     



            Guarantor Name     Account     Relation to     Date of     Phone      Billing Address



                     Type                Patient             Birth  

 

     



            Boris Puri     Personal/F     Self       1980     [phone]     [address] [place] [address]               ([place])              [place] [address]







Advance Directives





For more information, please contact:



[facility]



[address] [place], [place] [id number] [phone]









                          Date Inactivated          Comments



                           Code Status               Date Activated  

 

                          2018 10:46 PM        



                           Full Code                 10/26/2018  3:07 AM  









  



                           This code status was determined by:     Patient 









                                                     

 

                          10/25/2018  3:20 PM        



                           Full Code                 10/25/2018  1:56 PM  









  



                           This code status was determined by:     Patient 









                                                     

 

                          10/25/2018  1:56 PM        



                           Full Code                 10/25/2018  6:25 AM  









  



                           This code status was determined by:     Patient

## 2018-11-30 NOTE — NUR
patient recieved awake, alert, lying quietly in bed. vss. no c/o pain noted. pm assessment 
complete. no signs of bleeding noted to surgical site. patient instructed to call for 
assistance when needed.

## 2018-11-30 NOTE — OPERATIVE REPORT
DATE OF PROCEDURE:  November 30, 2018 



PREOPERATIVE DIAGNOSIS:  Thrombosed, prolapsing, bleeding, internal and 

external hemorrhoids.



POSTOPERATIVE DIAGNOSIS:  Thrombosed, prolapsing, bleeding, internal and 

external hemorrhoids.



OPERATION PERFORMED:  Internal and external hemorrhoidectomy.  



ASSISTANT:  APRIL ARRIAGA.



ANESTHESIA:  General.



COMPLICATIONS:  None.



ESTIMATED BLOOD LOSS:  Minimal.



DESCRIPTION OF PROCEDURE:  With the patient lying in bed in the lithotomy 

position under good general anesthesia, the perineum was prepped with 

Betadine solution and draped in the usual manner.  A complete anorectal 

block was then performed with quarter percent Marcaine and one percent 

Xylocaine mixed in equal parts.  There were 2 large groups of hemorrhoids 

at the 4 o'clock and 8 o'clock with prolapsing and they had mucosal changes 

secondary to the chronic exposure of the rectal mucosa.  There were some 

minimal hemorrhoidal changes at the 12 o'clock position.  Both the 

hemorrhoids at 4 and 8 o'clock had significant external components as well. 

 Both hemorrhoids were done in similar fashion.  The base of the hemorrhoid 

was then ligated with an 0 chromic suture.  The external component was then 

sharply resected, and all of the prolapsing mucosa was similarly resected.  

Hemostasis was ascertained.  The base of the hemorrhoid and the mucosa was 

then oversewn with the same 0 chromic suture, and perfect hemostasis was 

ascertained.  The skin and mucosa were then reapproximated with interrupted 

sutures of 3-0 chromic.  Both groups were done in similar fashion.  A 

Gelfoam pack impregnated with Xylocaine was then placed.  A dressing was 

applied.  The sponge, lap and needle count was correct.  The patient 

tolerated the procedure well and returned to the recovery room in stable 

condition.  









DD:  11/30/2018 12:07

DT:  11/30/2018 12:26

Job#:  [id number]

## 2018-11-30 NOTE — NUR
patient medicated with dilaudid 1mg and zofran 4mg ivp for c/o pain at this time. will 
continue to monitor.

## 2018-11-30 NOTE — XMS REPORT
Clinical Summary

                             Created on: 2018



Boris Puri

External Reference #: [id number]

: 1980

Sex: Male



Demographics







                          Address                   [address] [place]  [address]

 

                          Home Phone                [phone]

 

                          Preferred Language        English

 

                          Marital Status            Unknown

 

                          Pro[employer]ant Affiliation     [employer]

 

                          Race                      Unknown

 

                          Ethnic Group              /Latin





Author







                          Author                    [facility]

 

                          Organization              [facility]

 

                          Address                   Unknown

 

                          Phone                     Unavailable







Support







                Name            Relationship    Address         Phone

 

                    Mary Beth Landeros    [employer]                [address] [place]  [address]                [phone]







Care Team Providers







                    Care Team Member Name    Role                Phone

 

                    Amado Quesada MD    Unavailable         [phone]

 

                    Junior Tran MD    PCP                 [phone]







Allergies

No Known Allergies



Medications







                          End Date                  Status



              Medication     Sig          Dispensed     Refills      Start  



                                         Date  

 

                                                    Active



                     labetalol (NORMODYNE) 200     Take 200 mg         0   



                           MG tablet                 by mouth 2     



                                         (two) times     



                                         daily.     

 

                          10/30/2019                Active



              NIFEdipine (ADALAT CC) 90     Take 1 tablet     60 tablet     11           10/30/201  



                     MG 24 hr tablet     (90 mg total)       8  



                                         by mouth 2     



                                         (two) times     



                                         daily.     

 

                          10/30/2019                Active



              sevelamer (RENVELA) 800     Take 1 tablet     90 tablet     11           10/30/201  



                     mg tablet           (800 mg             8  



                                         total) by     



                                         mouth 3     



                                         (three) times     



                                         daily with     



                                         meals.     

 

                          10/30/2019                Active



              hydrALAZINE (APRESOLINE)     Take 1 tablet     90 tablet     11           10/30/201  



                     25 MG tablet        (25 mg total)       8  



                                         by mouth     



                                         every 8     



                                         (eight)     



                                         hours.     

 

                          10/31/2019                Active



              losartan (COZAAR) 50 MG     Take 1 tablet     30 tablet     11           10/31/201  



                     tablet              (50 mg total)       8  



                                         by mouth     



                                         daily.     

 

                          2019                Active



              hydrocortisone 1 % cream     Apply        30 g         0              



                           topically 2               8  



                                         (two) times     



                                         daily.     

 

                          10/17/2018                Discontinued



                     CYCLOSPORINE MODIFIED     Take 75 mg by       0   



                           ORAL                      mouth 2 (two)     



                                         times daily.     

 

                          10/17/2018                Discontinued



                     carvedilol (COREG) 25 MG     Take 25 mg by       0   



                           tablet                    mouth 2 (two)     



                                         times daily     



                                         with     



                                         breakfast and     



                                         dinner.     

 

                          10/17/2018                Discontinued



                     doxazosin (CARDURA) 4 MG     Take 8 mg by        0   



                           tablet                    mouth nightly     



                                         .     

 

                          10/17/2018                Discontinued



                     predniSONE (DELTASONE) 20     Take 20 mg by       0   



                           MG tablet                 mouth daily     



                                         1/2 tab     



                                         daily.     

 

                          10/17/2018                Discontinued



                     sodium bicarbonate 648 MG     Take 1 tablet       0   



                           tablet                    by mouth 3     



                                         (three) times     



                                         daily.     

 

                          10/17/2018                Discontinued



                     cloNIDine (CATAPRES) 0.2     Take 0.2 mg         0   



                           MG tablet                 by mouth as     



                                         needed.     

 

                          10/17/2018                Discontinued



                     cholecalciferol, vitamin     Take 1,000          0   



                           D3, 1,000 unit            Units by     



                           capsuleIndications: ESRD     mouth daily.     



                                         (end stage renal disease)      



                                         ([facility]), Pre-transplant      



                                         evaluation for chronic      



                                         kidney disease      

 

                          10/30/2018                Discontinued



                     NIFEdipine (ADALAT CC) 60     Take 60 mg by       0   



                           MG 24 hr                  mouth daily.     



                                         tabletIndications: ESRD      



                                         (end stage renal disease)      



                                         ([facility]), Pre-transplant      



                                         evaluation for chronic      



                                         kidney disease      

 

                          10/30/2018                Discontinued



                     sevelamer (RENVELA) 800     Take 3,200 mg       0   



                           mg tabletIndications:     by mouth 2     



                           ESRD (end stage renal     (two) times     



                           disease) ([facility])            daily .     

 

                          2018                



              baclofen (LIORESAL) 10 MG     Take 1 tablet     60 tablet     0            10/30/201  



                     tablet              (10 mg total)       8  



                                         by mouth 3     



                                         (three) times     



                                         daily as     



                                         needed (prn     



                                         hiccup) for     



                                         up to 30     



                                         days.     

 

                          2018                



              acetaminophen-codeine     Take 1 tablet     20 tablet     0              



                     (TYLENOL #3) 300-30 mg     by mouth            8  



                           per tablet                every 6 (six)     



                                         hours as     



                                         needed for up     



                                         to 10 days.     



                                         Max Daily     



                                         Amount: 4     



                                         tablets     







Active Problems







                                        Patient Care Coordination Note

 

                                        



JESSICA Sommers  

Tel # [phone]









 



                           Problem                   Noted Date

 

 



                           Thrombocytopenia          10/29/2018

 

 



                           Colitis                   10/23/2018

 

 



                           ESRD (end stage renal disease)     2014

 

 



                                         ESRD on hemodialysis 







Encounters







                          Care Team                 Description



                     Date                Type                Specialty  

 

                                        



Eldon Alvarez MD             Right leg pain (Primary Dx); 

Acute pain of right knee; 

Right leg swelling; 

ESRD on dialysis ([facility])



                     2018          Emergency           Emergency Medicine  

 

                                                     



                           2018                Travel   

 

                                        



Anant Garza MD                    Rectal bleeding (Primary Dx); 

External hemorrhoid, bleeding; 

Bright red blood per rectum; 

ESRD (end stage renal disease) ([facility]); 

Anemia, unspecified type



                     2018          Emergency           Emergency Medicine  

 

                                                     



                           2018                Travel   

 

                                        



Alondra Cueto MD                      R & L CATH (+/- SATS & CARDIAC OUTPUT)



                           10/25/2018                Surgery   

 

                                        



Jose Manuel Pinzon MD Giveon, Ron, MD                         Colitis (Primary Dx); 

ESRD on dialysis ([facility]); 

Generalized abdominal pain; 

ESRD on hemodialysis ([facility])



                     10/23/2018          [facility]            General Internal Medicine  



                           -                         Encounter   



                                         10/30/2018    

 

                                        



Cristina Hernandes RN                        Waitlist Maintenance



                     10/23/2018          Telephone           Transplant  

 

                                        



Kendra Larios MD                  ESRD (end stage renal disease) ([facility])



                     10/19/2018          Hospital            Radiology  



                                         Encounter   

 

                                        



Kendra Larios MD                  ESRD (end stage renal disease) ([facility])



                     10/19/2018          Hospital            Cardiology  



                                         Encounter   

 

                                        



Kendra Larios MD                   



                           10/19/2018                Outside Orders   

 

                                        



Kailey Monahan MD Finch, Jennifer L., MD                  ESRD (end stage renal disease) ([facility]) (Primary Dx)



                     10/17/2018          Evaluation          Transplant  

 

                                        



Ross Ewing                           Appointment



                     10/02/2018          Telephone           Transplant  

 

                                        



Cristina Hernandes RN                        ESRD (end stage renal disease) ([facility]) (Primary Dx); 

Awaiting transplantation of kidney



                     2018          Documentation       Transplant  

 

                                        



Cristina Hernandes RN                        Patient awaiting renal transplant (Primary Dx)



                     2018          Orders Only         Transplant  



after 2017



Family History







   



                 Medical History     Relation        Name            Comments

 

   



                           Kidney disease            Brother  

 

   



                           Unremarkable              Brother  

 

   



                           Unremarkable              Brother  

 

   



                           Unremarkable              Daughter  

 

   



                           Unremarkable              Father  

 

   



                     Cancer              Mother              

 

   



                     Cancer              Sister              colon

 

   



                           Kidney disease            Sister  

 

   



                           Unremarkable              Sister  

 

   



                           Unremarkable              Sister  









   



                 Relation        Name            Status          Comments

 

   



                           Brother                   Alive 

 

   



                           Brother                   Alive 

 

   



                           Brother                   Alive 

 

   



                           Daughter                  Alive 

 

   



                           Father                     

 

   



                           Mother                     

 

   



                           Sister                    Alive 

 

   



                           Sister                    Alive 

 

   



                           Sister                    Alive 







Social History







                                        Date



                 Tobacco Use     Types           Packs/Day       Years Used 

 

                                         



                                         Former Smoker    

 

    



                           Smokeless Tobacco: Former       Quit: 2004



                                         User   









                                        Comments: Quit more than 20 yrs ago.









   



                 Alcohol Use     Drinks/Week     oz/Week         Comments

 

   



                                         No   









 



                           Sex Assigned at Birth     Date Recorded

 

 



                                         Not on file 









                                        Industry



                           Job Start Date            Occupation 

 

                                        Not on file



                           Not on file               Not on file 









                                        Travel End



                           Travel History            Travel Start 

 





                                         No recent travel history available.







Last Filed Vital Signs







                                        Time Taken



                           Vital Sign                Reading 

 

                                        2018  2:40 PM CST



                           Blood Pressure            135/78 

 

                                        2018  2:40 PM CST



                           Pulse                     82 

 

                                        2018  2:40 PM CST



                           Temperature               36.7 C (98.1 F) 

 

                                        2018  2:40 PM CST



                           Respiratory Rate          16 

 

                                        2018  1:47 PM CST



                           Oxygen Saturation         99% 

 

                                        -



                           Inhaled Oxygen            - 



                                         Concentration  

 

                                        2018 11:56 AM CST



                           Weight                    77.6 kg (171 lb) 

 

                                        2018 11:56 AM CST



                           Height                    167.6 cm (5' 6") 

 

                                        2018 11:56 AM CST



                           Body Mass Index           27.6 







Plan of Treatment







   



                 Health Maintenance     Due Date        Last Done       Comments

 

   



                           INFLUENZA VACCINE         10/01/2018  







Procedures







                                        Comments



                 Procedure Name     Priority        Date/Time       Associated Diagnosis 

 

                                        



Results for this procedure are in the results section.



                     VENOUS DOPPLER LEG, RIGHT     STAT                2018  



                                         1:38 PM CST  

 

                                        



Results for this procedure are in the results section.



                     CBC W/PLT COUNT & AUTO     STAT                2018  



                           DIFFERENTIAL              11:21 PM CST  

 

                                        



Results for this procedure are in the results section.



                     PT/APTT             STAT                2018  



                                         11:21 PM CST  

 

                                        



Results for this procedure are in the results section.



                     CBC W/PLT COUNT & AUTO     STAT                2018  



                           DIFFERENTIAL              11:21 PM CST  

 

                                        



Results for this procedure are in the results section.



                     BASIC METABOLIC PANEL (7)     STAT                2018  



                                         11:21 PM CST  

 

                                         



                           CARDIAC CATH REPORT -      10/31/2018  



                           SCAN                      2:40 PM CDT  

 

                                         



                           VASCULAR DIAGRAM -SCAN      10/31/2018  



                                         2:40 PM CDT  

 

                                         



                           RHYTHM STRIP - SCAN       10/31/2018  



                                         2:40 PM CDT  

 

                                         



                           ECHOCARDIOGRAM REPORT -      10/30/2018  



                           SCAN                      6:30 PM CDT  

 

                                        



Results for this procedure are in the results section.



                     TISSUE EXAM         AP Routine          10/30/2018  



                                         8:07 AM CDT  

 

                                        



Results for this procedure are in the results section.



                     CBC W/PLT COUNT & AUTO     Routine             10/30/2018  



                           DIFFERENTIAL              4:53 AM CDT  

 

                                        



Results for this procedure are in the results section.



                     PHOSPHORUS          Routine             10/30/2018  



                                         4:53 AM CDT  

 

                                        



Results for this procedure are in the results section.



                     MAGNESIUM           Routine             10/30/2018  



                                         4:53 AM CDT  

 

                                        



Results for this procedure are in the results section.



                     BASIC METABOLIC PANEL (7)     Routine             10/30/2018  



                                         4:53 AM CDT  

 

                                        



Results for this procedure are in the results section.



                     CBC W/PLT COUNT & AUTO     Routine             10/30/2018  



                           DIFFERENTIAL              4:53 AM CDT  

 

                                        



Results for this procedure are in the results section.



                     US CORE BIOPSY      Routine             10/29/2018  



                                         4:50 PM CDT  

 

                                        



Results for this procedure are in the results section.



                     CBC W/PLT COUNT & AUTO     Routine             10/29/2018  



                           DIFFERENTIAL              5:46 AM CDT  

 

                                        



Results for this procedure are in the results section.



                     PHOSPHORUS          Routine             10/29/2018  



                                         5:46 AM CDT  

 

                                        



Results for this procedure are in the results section.



                     MAGNESIUM           Routine             10/29/2018  



                                         5:46 AM CDT  

 

                                        



Results for this procedure are in the results section.



                     BASIC METABOLIC PANEL (7)     Routine             10/29/2018  



                                         5:46 AM CDT  

 

                                        



Results for this procedure are in the results section.



                     CBC W/PLT COUNT & AUTO     Routine             10/29/2018  



                           DIFFERENTIAL              5:46 AM CDT  

 

                                        



Results for this procedure are in the results section.



                     CBC W/PLT COUNT & AUTO     Routine             10/28/2018  



                           DIFFERENTIAL              5:55 AM CDT  

 

                                        



Results for this procedure are in the results section.



                     PHOSPHORUS          Routine             10/28/2018  



                                         5:55 AM CDT  

 

                                        



Results for this procedure are in the results section.



                     MAGNESIUM           Routine             10/28/2018  



                                         5:55 AM CDT  

 

                                        



Results for this procedure are in the results section.



                     BASIC METABOLIC PANEL (7)     Routine             10/28/2018  



                                         5:55 AM CDT  

 

                                        



Results for this procedure are in the results section.



                     CBC W/PLT COUNT & AUTO     Routine             10/28/2018  



                           DIFFERENTIAL              5:55 AM CDT  

 

                                        



Results for this procedure are in the results section.



                     HEMODIALYSIS INPATIENT     Routine             10/27/2018  



                                         11:54 AM CDT  

 

                                        



Results for this procedure are in the results section.



                     CBC W/PLT COUNT & AUTO     Routine             10/27/2018  



                           DIFFERENTIAL              6:39 AM CDT  

 

                                        



Results for this procedure are in the results section.



                     KAPPA / LAMBDA LIGHT     Routine             10/27/2018  



                           CHAINS, SERUM             6:39 AM CDT  

 

                                        



Results for this procedure are in the results section.



                     PROTEIN ELECTROPHORESIS,     AP Routine          10/27/2018  



                           SERUM                     6:39 AM CDT  

 

                                        



Results for this procedure are in the results section.



                     PHOSPHORUS          Routine             10/27/2018  



                                         6:39 AM CDT  

 

                                        



Results for this procedure are in the results section.



                     MAGNESIUM           Routine             10/27/2018  



                                         6:39 AM CDT  

 

                                        



Results for this procedure are in the results section.



                     BASIC METABOLIC PANEL (7)     Routine             10/27/2018  



                                         6:39 AM CDT  

 

                                        



Results for this procedure are in the results section.



                     CBC W/PLT COUNT & AUTO     Routine             10/27/2018  



                           DIFFERENTIAL              6:39 AM CDT  

 

                                         



                           TRANSFUSION SERVICE       10/26/2018  



                           REPORT - SCAN             5:53 PM CDT  

 

                                        



Results for this procedure are in the results section.



                     ULTRAFILTRATION HD CRRT     Routine             10/26/2018  



                                         11:49 AM CDT  

 

                                        



Results for this procedure are in the results section.



                     CBC W/PLT COUNT & AUTO     Routine             10/26/2018  



                           DIFFERENTIAL              8:55 AM CDT  

 

                                        



Results for this procedure are in the results section.



                     PHOSPHORUS          Routine             10/26/2018  



                                         8:55 AM CDT  

 

                                        



Results for this procedure are in the results section.



                     MAGNESIUM           Routine             10/26/2018  



                                         8:55 AM CDT  

 

                                        



Results for this procedure are in the results section.



                     BASIC METABOLIC PANEL (7)     Routine             10/26/2018  



                                         8:55 AM CDT  

 

                                        



Results for this procedure are in the results section.



                     CBC W/PLT COUNT & AUTO     Routine             10/26/2018  



                           DIFFERENTIAL              8:55 AM CDT  

 

                                         



                     PCI                 10/25/2018          Heart failure, 



                           1:29 PM CDT               unspecified HF 



                                         chronicity, unspecified 



                                         heart failure type (HCC) 

 

                                         



                     R & L CATH (+/- SATS &      10/25/2018          Heart failure, 



                     CARDIAC OUTPUT)      1:29 PM CDT         unspecified HF 



                                         chronicity, unspecified 



                                         heart failure type (HCC) 

 

                                        



Results for this procedure are in the results section.



                     TYPE AND SCREEN,     Routine             10/25/2018  



                           AUTOMATED                 8:27 AM CDT  

 

                                        



Results for this procedure are in the results section.



                     FOLATE, RBC         Routine             10/25/2018  



                                         6:29 AM CDT  

 

                                        



Results for this procedure are in the results section.



                     RETICULOCYTE COUNT     Routine             10/25/2018  



                                         6:29 AM CDT  

 

                                        



Results for this procedure are in the results section.



                     CBC W/PLT COUNT & AUTO     Routine             10/25/2018  



                           DIFFERENTIAL              6:25 AM CDT  

 

                                        



Results for this procedure are in the results section.



                     PERIPHERAL BLOOD SMEAR -     Routine             10/25/2018  



                           HOLD ONLY                 6:25 AM CDT  

 

                                        



Results for this procedure are in the results section.



                     CBC W/PLT COUNT & AUTO     Routine             10/25/2018  



                           DIFFERENTIAL              6:25 AM CDT  

 

                                        



Results for this procedure are in the results section.



                     PT/APTT             Routine             10/25/2018  



                                         6:25 AM CDT  

 

                                        



Results for this procedure are in the results section.



                     FIBRINOGEN          Routine             10/25/2018  



                                         6:25 AM CDT  

 

                                        



Results for this procedure are in the results section.



                     D-DIMER             Routine             10/25/2018  



                                         6:25 AM CDT  

 

                                        



Results for this procedure are in the results section.



                     RHEUMATOID FACTOR TITER     Routine             10/25/2018  



                                         6:24 AM CDT  

 

                                        



Results for this procedure are in the results section.



                     VITAMIN B12         Routine             10/25/2018  



                                         6:24 AM CDT  

 

                                        



Results for this procedure are in the results section.



                     RHEUMATOID FACTOR AB,     Routine             10/25/2018  



                           REFLEX TO TITER           6:24 AM CDT  

 

                                        



Results for this procedure are in the results section.



                     PROTEIN ELECTROPHORESIS,     AP Routine          10/25/2018  



                           SERUM                     6:24 AM CDT  

 

                                        



Results for this procedure are in the results section.



                     HIV-1 ANTIGEN WITH     Routine             10/25/2018  



                           HIV-1/2 ANTIBODY          6:24 AM CDT  

 

                                        



Results for this procedure are in the results section.



                     ANTI-NUCLEAR ANTIBODY     Routine             10/25/2018  



                           (MEGHAN)                     6:24 AM CDT  

 

                                        



Results for this procedure are in the results section.



                     HEPATITIS B SURFACE     Routine             10/25/2018  



                           ANTIGEN                   6:23 AM CDT  

 

                                        



Results for this procedure are in the results section.



                     PHOSPHORUS          Routine             10/25/2018  



                                         6:23 AM CDT  

 

                                        



Results for this procedure are in the results section.



                     MAGNESIUM           Routine             10/25/2018  



                                         6:23 AM CDT  

 

                                        



Results for this procedure are in the results section.



                     BASIC METABOLIC PANEL (7)     Routine             10/25/2018  



                                         6:23 AM CDT  

 

                                        



Results for this procedure are in the results section.



                     EBV VIRAL LOAD      Routine             10/25/2018  



                                         6:23 AM CDT  

 

                                        



Results for this procedure are in the results section.



                     CMV PCR, QUANTITATIVE     Routine             10/25/2018  



                                         6:23 AM CDT  

 

                                        



Results for this procedure are in the results section.



                     TRANSESOPHAGEAL ECHO     Routine             10/25/2018  



                                         12:00 AM CDT  

 

                                         



                     CONT WAVE PULSED DOPPLER     Routine             10/24/2018  



                                         8:11 PM CDT  

 

                                         



                     COLOR-FLOW MAPPING     Routine             10/24/2018  



                                         8:11 PM CDT  

 

                                        



Results for this procedure are in the results section.



                     CBC W/PLT COUNT & AUTO     Routine             10/24/2018  



                           DIFFERENTIAL              3:49 AM CDT  

 

                                        



Results for this procedure are in the results section.



                     PHOSPHORUS          Routine             10/24/2018  



                                         3:49 AM CDT  

 

                                        



Results for this procedure are in the results section.



                     MAGNESIUM           Routine             10/24/2018  



                                         3:49 AM CDT  

 

                                        



Results for this procedure are in the results section.



                     BASIC METABOLIC PANEL (7)     Routine             10/24/2018  



                                         3:49 AM CDT  

 

                                        



Results for this procedure are in the results section.



                     CBC W/PLT COUNT & AUTO     Routine             10/24/2018  



                           DIFFERENTIAL              3:49 AM CDT  

 

                                        



Results for this procedure are in the results section.



                     CT CHEST WITHOUT IV     Routine             10/24/2018  



                           CONTRAST                  3:24 AM CDT  

 

                                        



Results for this procedure are in the results section.



                     CT ABDOMEN/PELVIS WITH IV     STAT                10/23/2018  



                           CONTRAST                  5:50 PM CDT  

 

                                        



Results for this procedure are in the results section.



                     CBC W/PLT COUNT & AUTO     STAT                10/23/2018  



                           DIFFERENTIAL              1:52 PM CDT  

 

                                        



Results for this procedure are in the results section.



                     CBC W/PLT COUNT & AUTO     STAT                10/23/2018  



                           DIFFERENTIAL              1:52 PM CDT  

 

                                        



Results for this procedure are in the results section.



                     LIPASE              STAT                10/23/2018  



                                         1:52 PM CDT  

 

                                        



Results for this procedure are in the results section.



                     HEPATIC FUNCTION PANEL     STAT                10/23/2018  



                                         1:52 PM CDT  

 

                                        



Results for this procedure are in the results section.



                     BASIC METABOLIC PANEL (7)     STAT                10/23/2018  



                                         1:52 PM CDT  

 

                                        



Results for this procedure are in the results section.



                     AMYLASE             STAT                10/23/2018  



                                         1:52 PM CDT  

 

                                         



                           ECHOCARDIOGRAM REPORT -      10/19/2018  



                           SCAN                      3:23 PM CDT  

 

                                        



Results for this procedure are in the results section.



                 US ABDOMEN COMPLETE     Routine         10/19/2018      ESRD (end stage renal 



                           3:08 PM CDT               disease) (HCC) 

 

                                        



Results for this procedure are in the results section.



                 2D ECHO W/ DOPPLER     Routine         10/19/2018      ESRD (end stage renal 



                     (CW/PW/COLOR)       1:38 PM CDT         disease) (HCC) 

 

                                        



Results for this procedure are in the results section.



                 FLOW PRA CLASS II WITH     Routine         2018      Patient awaiting renal 



                     REFLEX TO ANTIBODY      10:34 AM CDT        transplant 



                                         SPECIFICITY    

 

                                        



Results for this procedure are in the results section.



                 FLOW PRA CLASS I WITH     Routine         2018      Patient awaiting renal 



                     REFLEX TO ANTIBODY      10:34 AM CDT        transplant 



                                         SPECIFICITY    

 

                                        



Results for this procedure are in the results section.



                 FLOW PRA CLASS II WITH     Routine         2018      Patient awaiting renal 



                     REFLEX TO ANTIBODY      12:23 PM CDT        transplant 



                                         SPECIFICITY    

 

                                        



Results for this procedure are in the results section.



                 FLOW PRA CLASS I WITH     Routine         2018      Patient awaiting renal 



                     REFLEX TO ANTIBODY      12:23 PM CDT        transplant 



                                         SPECIFICITY    

 

                                        



Results for this procedure are in the results section.



                 FLOW PRA CLASS II WITH     Routine         2018      Patient awaiting renal 



                     REFLEX TO ANTIBODY      3:36 PM CST         transplant 



                                         SPECIFICITY    

 

                                        



Results for this procedure are in the results section.



                 FLOW PRA CLASS I WITH     Routine         2018      Patient awaiting renal 



                     REFLEX TO ANTIBODY      3:36 PM CST         transplant 



                                         SPECIFICITY    



after 2017



Results

* Venous doppler leg, right (2018  1:38 PM CST)





   



                 Component       Value           Ref Range       Performed At

 

   



                           Ejection Fraction         [facility]



                                         [facility]









 



                           Impressions               Performed At

 

 



                           Right Impression          [facility]



                           1. There is no deep venous obstruction in the common femoral, profunda     [facility]



                                         femoral, femoral, popliteal, posterior tibial or peroneal veins. 



                                         2. There is no superficial venous obstruction in the great saphenous vein. 



                                         Conclusions 



                                         Summary 



                                         Venous duplex imaging and compression of the right lower extremity was 



                                         performed. The veins were adequately visualized. The right venous system 



                                         was patent and compressible with no evidence of thrombus. The venous 



                                         Doppler waveforms were pulsatile indicating possible elevated right heart 



                                         filling pressure. 



                                         Signature 



                                         ---------------------------------------------------------------- 



                                         Electronically signed by Kendra Bhatt MD(Interpreting 



                                         physician) on 2018 05:23 PM 



                                         ---------------------------------------------------------------- 



                                         Velocities are measured in cm/s ; Diameters are measured in cm 









 



                           Narrative                 Performed At

 

 



                           PV LAB - Lower Extremities DVT Study     [facility]



                           Demographics              [facility]



                                         Patient NameBORIS PURI Date of Study 



                                         2018 



                                          NOEMI 



                                         MRN 



                                         [id number] 38 



                                         Visit 



                                         [id number]

 



                                          



                                         Accession [id number] [id number] Birth 



                                         1980 



                                         Referring ELDON ALVAREZ Room Number 



                                         ED13 



                                         Physician 



                                         Sonographer Jonah McknightlPhysicidashawn Odonnell 



                                          T 



                                         Procedure 



                                         Type of Study: 



                                         Veins: Lower Extremities DVT Study, VENOUS DOPPLER LEG, RIGHT. 



                                         Indications for Study:Knee pain and Leg pain . 



                                         Patient Status:STAT. 



                                         Study Location:Portable. 



                                         Technical Quality:Adequate visualization. 



                                         Risk Factors 



                                         History of Disease 



                                         +------------------------------------------------------------+----+--------+ 



                                         !Diagnosis

 



                                          !Date!Comments! 



                                         +------------------------------------------------------------+----+--------+ 



                                         !History/Risk 



                                         Factors:

 



                                          !!leg pain! 



                                         +------------------------------------------------------------+----+--------+ 









                                        Procedure Note

 

                                        



Interface, External Ris In - 2018  5:23 PM CST



PV LAB - Lower Extremities DVT Study



 Demographics

 

 Patient Name      BORIS PURI     Date of Study       2018

                   NOEMI

 

 MRN               [id number]          Age                 38

 

 Visit Number      [id number]        Gender              Male

 

 Accession Number  [id number]          Date of Birth       1980

 

 Referring         ELDON ALVAREZ     Room Number         ED13

 Physician

 

 Sonographer       Bola ASH         Interpreting        Duarte Cespedes          Physician           MD Lianna Odonnell

                   RVT

 

Procedure

Type of Study:

 

 Veins: Lower Extremities DVT Study, VENOUS DOPPLER LEG, RIGHT.

 

Indications for Study:Knee pain and Leg pain .



Patient Status:STAT.

Study Location:Portable.

Technical Quality:Adequate visualization.



Risk Factors

History of Disease

                                        +------------------------------------------------------------+----+--------+

!Diagnosis                                                   !Date!Comments!

                                        +------------------------------------------------------------+----+--------+

!History/Risk Factors:                                       !    !leg pain!

                                        +------------------------------------------------------------+----+--------+



Impressions

Right Impression

                                        1. There is no deep venous obstruction in the common femoral, profunda

femoral, femoral, popliteal, posterior tibial or peroneal veins.

                                        2. There is no superficial venous obstruction in the great saphenous vein.



 Conclusions

 

 Summary

 

 Venous duplex imaging and compression of the right lower extremity was

 performed. The veins were adequately visualized. The right venous system

 was patent and compressible with no evidence of thrombus. The venous

 Doppler waveforms were pulsatile indicating possible elevated right heart

 filling pressure.

 

 Signature

 

 ----------------------------------------------------------------

 Electronically signed by Kendra Bhatt MD(Interpreting

 physician) on 2018 05:23 PM

 ----------------------------------------------------------------

 

Velocities are measured in cm/s ; Diameters are measured in cm











   



                 Performing Organization     Address         City/State/Zipcode     Phone Number

 

   



                                         [facility]   



                                         [facility]   





* PT/aPTT (2018 11:21 PM CST)



Only the most recent of 2 results within the time period is included.





   



                 Component       Value           Ref Range       Performed At

 

   



                 Protime         16.2 (H)        11.7 - 14.7 seconds     [facility]

 

   



                 INR             1.3             <=5.9           [facility]

 

   



                 PTT             30.5            22.5 - 36.0 seconds     [facility]













                                         Specimen

 





                                         Blood - Arm, Left









 



                           Narrative                 Performed At

 

 



                           RECOMMENDED COUMADIN/WARFARIN INR THERAPY RANGES     [facility]



                           STANDARD DOSE: 2.0 - 3.0 Includes: PROPHYLAXIS for venous thrombosis,     [facility]



                                         systemic embolization; TREATMENT for venous thrombosis and/or pulmonary embolus.

 



                                         HIGH RISK: Target INR is 2.5-3.5 for patients with mechanical heart valves. 









   



                 Performing Organization     Address         City/State/Zipcode     Phone Number

 

   



                 [facility]     [address] [place] [address] [phone]





                                         [facility]   





* CBC with platelet count + automated diff (2018 11:21 PM CST)



Only the most recent of 9 results within the time period is included.





   



                 Component       Value           Ref Range       Performed At

 

   



                 WBC             6.5             3.5 - 10.5 K/L     [facility]

 

   



                 RBC             3.95 (L)        4.63 - 6.08 M/L     [facility]

 

   



                 Hemoglobin      10.6 (L)        13.7 - 17.5 GM/DL     [facility]

 

   



                 Hematocrit      33.8 (L)        40.1 - 51.0 %     [facility]

 

   



                 MCV             85.6            79.0 - 92.2 fL     [facility]

 

   



                 MCH             26.8            25.7 - 32.2 pg     [facility]

 

   



                 MCHC            31.4 (L)        32.3 - 36.5 GM/DL     [facility]

 

   



                 RDW             19.3 (H)        11.6 - 14.4 %     [facility]

 

   



                 Platelets       131 (L)         150 - 450 K/CU MM     [facility]

 

   



                 MPV             9.8             9.4 - 12.4 fL     [facility]

 

   



                 nRBC            0               0 - 0 /100 WBC     [facility]

 

   



                 % Neutros       58              %               [facility]

 

   



                 % Lymphs        21              %               [facility]

 

   



                 % Monos         8               %               [facility]

 

   



                 % Eos           11              %               [facility]

 

   



                 % Baso          1               %               [facility]

 

   



                 # Neutros       3.82            1.78 - 5.38 K/L     [facility]

 

   



                 # Lymphs        1.39            1.32 - 3.57 K/L     [facility]

 

   



                 # Monos         0.52            0.30 - 0.82 K/L     [facility]

 

   



                 # Eos           0.72 (H)        0.04 - 0.54 K/L     [facility]

 

   



                 # Baso          0.08            0.01 - 0.08 K/L     [facility]

 

   



                 Immature        0               0 - 1 %         [facility]



                           Granulocytes-Relative       [facility]













                                         Specimen

 





                                         Blood - Arm, Left









   



                 Performing Organization     Address         City/State/Zipcode     Phone Number

 

   



                 [facility]     [address] [place] [address] [phone]





                                         [facility] [place]   





* Basic Metabolic Panel (2018 11:21 PM CST)



Only the most recent of 9 results within the time period is included.





   



                 Component       Value           Ref Range       Performed At

 

   



                 Sodium          141             136 - 145 meq/L     [facility]

 

   



                 Potassium       4.8             3.5 - 5.1 meq/L     [facility]

 

   



                 Chloride        103             98 - 107 meq/L     [facility]

 

   



                 CO2             25              22 - 29 meq/L     [facility]

 

   



                 BUN             46 (H)          7 - 21 mg/dL     [facility]

 

   



                 Creatinine      8.75 (H)        0.57 - 1.25 mg/dL     [facility]

 

   



                 Glucose         103             70 - 105 mg/dL     [facility]

 

   



                 Calcium         9.1             8.4 - 10.2 mg/dL     [facility]

 

   



                 EGFR            7Comment: ESTIMATED GFR IS NOT     mL/min/1.73 sq m     [facility]





                           AS ACCURATE AS CREATININE      [facility]



                                         CLEARANCE IN PREDICTING  



                                         GLOMERULAR FILTRATION RATE.  



                                         ESTIMATED GFR IS NOT  



                                         APPLICABLE FOR DIALYSIS  



                                         PATIENTS.  













                                         Specimen

 





                                         Blood - Arm, Left









   



                 Performing Organization     Address         City/State/Zipcode     Phone Number

 

   



                 [facility]     [address] [place], [place] [address] [phone]





                                         [facility]   





* CARDIAC CATH REPORT - SCAN (10/31/2018  2:40 PM CDT)





 



                           Narrative                 Performed At

 

 



                                         This result has an attachment that is not available. 





* VASCULAR DIAGRAM -SCAN (10/31/2018  2:40 PM CDT)





 



                           Narrative                 Performed At

 

 



                                         This result has an attachment that is not available. 





* RHYTHM STRIP - SCAN (10/31/2018  2:40 PM CDT)





 



                           Narrative                 Performed At

 

 



                                         This result has an attachment that is not available. 





* ECHOCARDIOGRAM REPORT - SCAN (10/30/2018  6:30 PM CDT)





 



                           Narrative                 Performed At

 

 



                                         This result has an attachment that is not available. 





* Tissue Exam (10/30/2018  8:07 AM CDT)





   



                 Component       Value           Ref Range       Performed At

 

   



                     Case Report         Surgical Pathology      [facility]



                           Report      [facility]



                                          Case:  



                                         [id number]  



                                           



                                           



                                         Authorizing Provider:Vern Echevarria MD (Mark)  



                                         Collected:  



                                         10/30/2018  



                                         0807  



                                         Ordering Location:  



                                         [address]  



                                         Received:  



                                         10/30/2018  



                                         0807  



                                           



                                           



                                         Service  



                                           



                                           



                                           



                                           



                                         Pathologist:  



                                          Naldo Duval MD  



                                           



                                           



                                           



                                         Specimen:Abdomen, FAT  



                                         PAD BX EVALUATION FOR  



                                         AMYLOID  



                                           



                                           



                                           

 

   



                     DIAGNOSIS           SOFT TISSUE, "FAT PAD",      [facility]



                           ULTRASOUND-GUIDED CORE NEEDLE      [facility]



                                         BIOPSY:  



                                         FIBROADIPOSE TISSUE WITHOUT  



                                         SIGNIFICANT HISTOPATHOLOGIC  



                                         ALTERATION.  



                                         CONGO RED SPECIAL STAIN IS  



                                         NEGATIVE FOR AMYLOID.  



                                         Signing Pathologist  



                                         Direct Phone Line:  



                                         [phone]  

 

   



                     CPT Code(s)         75838, 62761        [facility]

 

   



                     CLINICAL HISTORY     Evaluate for amyloid      [facility]

 

   



                     SPECIMEN SOURCE     Right abdomen fat pad biopsy      [facility]

 

   



                     GROSS DESCRIPTION     The specimen is received in a      [facility]



                           formalin-filled container and      [facility]



                                         labeled with the patient's  



                                         information and labeled  



                                         "abdomen right fat pad biopsy"  



                                         and consists of two  



                                         yellow-white core biopsies  



                                         both measuring 1.5 cm in  



                                         length, submitted entirely A1.  



                                         CG/pl  

 

   



                     MICROSCOPIC DESCRIPTION     Performed.          [facility]

 

   



                     SPECIAL STUDIES     The following special studies      [facility]



                           were performed on this case      [facility]



                                         and the interpretation is  



                                         incorporated in the diagnostic  



                                         report above:  



                                         BLOCK A1- CONGO RED.  

 

   



                     Gross assessment was     [facility]



                     performed at        [place]



                                         Pathology, [address] [place] [address], Tel  



                                         [phone]  

 

   



                     Technical component was     [facility]



                     performed at        [place], [facility]



                                         Pathology, [address] [place] [address], Tel  



                                         [phone]  

 

   



                     Professional component     [facility]



                     was performed at     [place], [facility]



                                         Pathology, [address] [place] [address], Tel  



                                         [phone]  













                                         Specimen

 





                                         Tissue - Abdomen









   



                 Performing Organization     Address         City[facility]     Phone Number

 

   



                 [place] [address]     [phone] [place] [address]   





* Phosphorus (10/30/2018  4:53 AM CDT)



Only the most recent of 7 results within the time period is included.





   



                 Component       Value           Ref Range       Performed At

 

   



                 Phosphorus      5.6 (H)         2.3 - 4.7 mg/dL     [facility]













                                         Specimen

 





                                         Blood - Arm, Right









   



                 Performing Organization     Address         City[facility]     Phone Number

 

   



                 [place] [address]     [phone]   





* Magnesium (10/30/2018  4:53 AM CDT)



Only the most recent of 7 results within the time period is included.





   



                 Component       Value           Ref Range       Performed At

 

   



                 Magnesium       2.3             1.6 - 2.6 mg/dL     [facility]













                                         Specimen

 





                                         Blood - Arm, Right









   



                 Performing Organization     Address         [facility]     Phone Number

 

   



                 [place] [address]     [phone] [place] [address]   





* US Core Biopsy (10/29/2018  4:50 PM CDT)





 



                           Narrative                 Performed At

 

 



                           FINAL REPORT              [facility]



                                         PATIENT ID: [id number] 



                                         Ultrasound guided subcutaneous fat pad biopsy dated 10/29/2018 



                                         Procedure: Subcutaneous fat pad biopsy 



                                         Pre-procedure diagnosis: Suspicious for amyloidosis 



                                         Post-procedure diagnosis: Suspicious for amyloidosis 



                                         Radiologist: Celestine Cook MD 



                                         Assistant: None 



                                         Sedation: None. 



                                         Anesthesia: 1% Xylocaine local anesthesia. 



                                         Technique/Specimen removed: After obtaining informed consent, 



                                         ultrasound-guided core biopsy of the subcutaneous fat pad was 



                                         performed under usual sterile technique. After local anesthetic, core 



                                         biopsy was performed under ultrasound guidance. 5 passes were 



                                         obtained with an 18-gauge core biopsy needle. 



                                         Complication: None 



                                         Estimated Blood Loss: None 



                                         Graft/Implants: None 



                                         Impression: Successful ultrasound-guided core biopsy of the 



                                         subcutaneous fat pad. 



                                         Signed: Celestine Cook MD 



                                         Report Verified Date/Time:10/29/2018 17:14:49 



                                         Reading Location: [address] Ultrasound Reading Room 



                                         Electronically signed by: CELESTINE COOK M.D. on 10/29/2018 05:14 PM

 









                                        Procedure Note

 

                                        



Interface, External Ris In - 10/29/2018  5:17 PM CDT



FINAL REPORT

 

PATIENT ID:   [id number]

 

 

Ultrasound guided subcutaneous fat pad biopsy dated 10/29/2018

 

Procedure: Subcutaneous fat pad biopsy

 

Pre-procedure diagnosis: Suspicious for amyloidosis

 

Post-procedure diagnosis: Suspicious for amyloidosis

 

Radiologist: Celestine Cook MD

 

Assistant: None

 

Sedation: None.

 

Anesthesia: 1% Xylocaine local anesthesia.

 

Technique/Specimen removed: After obtaining informed consent, 

ultrasound-guided core biopsy of the subcutaneous fat pad was 

performed under usual sterile technique. After local anesthetic, core 

biopsy was performed under ultrasound guidance. 5 passes were 

obtained with an 18-gauge core biopsy needle.

 

Complication: None

 

Estimated Blood Loss: None

 

Graft/Implants: None

 

Impression: Successful ultrasound-guided core biopsy of the 

subcutaneous fat pad.

 

Signed: Celestine Cook MD

Report Verified Date/Time:  10/29/2018 17:14:49

 

Reading Location: [address] Ultrasound Reading Room

      Electronically signed by: CELESTINE COOK M.D. on 10/29/2018 05:14 PM

 









   



                 Performing Organization     Address         City/State/Zipcode     Phone Number

 

   



                                          [facility]   





* HEMODIALYSIS INPATIENT (10/27/2018 11:54 AM CDT)





 



                           Narrative                 Performed At

 

 



                                         Abardo, Aelyn, RN 10/27/2018 11:56 AM 



                                         Received pt awake alert responsive. Explained hd tx plan today pt 



                                         agreed verbalized understanding.Cannulated left avf without 



                                         difficulty using buttonhole technique. 



                                         Lab Results 



                                         Component Value Date 



                                         WBC 6.8 10/27/2018 



                                         HGB 11.9 (L) 10/27/2018 



                                         HCT 38.7 (L) 10/27/2018 



                                         MCV 85.4 10/27/2018 



                                          (L) 10/27/2018 



                                         Chemistry 



                                          



                                         Component Value Date/Time 



                                          (L) 10/27/2018 0639 



                                         K 5.1 10/27/2018 0639 



                                         CL 99 10/27/2018 0639 



                                         CO2 25 10/27/2018 0639 



                                         BUN 32 (H) 10/27/2018 0639 



                                         CREATININE 8.60 (H) 10/27/2018 0639 



                                          



                                         Component Value Date/Time 



                                         CALCIUM 8.5 10/27/2018 0639 



                                         ALKPHOS 167 (H) 10/23/2018 1352 



                                         AST 20 10/23/2018 1352 



                                         ALT 11 10/23/2018 1352 



                                         BILITOT 1.2 10/23/2018 1352 



                                         Results for BORIS PURI (MRN [id number]) as of 10/27/2018 



                                         11:55 



                                         Ref. Range 10/25/2018 06:23 



                                         Hepatitis B Surface Ag Latest Ref Range: NonreactiveNonreactive 



                                         To monitor pt. 





* Kappa / lambda light chains, serum (10/27/2018  6:39 AM CDT)





   



                 Component       Value           Ref Range       Performed At

 

   



                 Kappa Lt Chain,Free     205.1 (H)       3.3 - 19.4 mg/L     [facility]

 

   



                 Lambda Lt Chain,Free     176.0 (H)       5.7 - 26.3 mg/L     [facility]

 

   



                 Kappa/Lambda,Free     1.17            0.26 - 1.65     [facility]



                           Comment:                  [facility]



                                         Free kappa/lambda ratio in  



                                         serum of normal individuals is  



                                         0.26-1.65. Excess  



                                         production of free kappa or  



                                         lambda chains can alter this  



                                         ratio. Monoclonal  



                                         free light chains are found in  



                                         serum of patients with  



                                         multiple myeloma,  



                                         Waldenstrom's  



                                         macroglobulinemia, mu-heavy  



                                         chain disease, primary  



                                         amyloidosis,  



                                         light chain deposition  



                                         disease, monoclonal gammopathy  



                                         of undetermined  



                                         significance, and  



                                         lymphoproliferative disorders.  



                                         Measurement of free light  



                                         chain concentration in serum  



                                         is useful for diagnosis,  



                                         prognosis, monitoring  



                                         disease activity and following  



                                         response to therapy of these  



                                         disorders.  













                                         Specimen

 





                                         Blood - Arm, Right









 



                           Narrative                 Performed At

 

 



                           Performing Lab            [facility]



                            [facility]



                                          [address] 



                                          [address] [place] [address] 



                                          PRINCE Villafana MD, PhD, AYUSH 









   



                 Performing Organization     Address         City/State/Zipcode     Phone Number

 

   



                     [facility]     [facility], [address]     [place], [place] 



                     [facility]        [place]      [id number] 





* Protein electrophoresis, serum (10/27/2018  6:39 AM CDT)



Only the most recent of 2 results within the time period is included.





   



                 Component       Value           Ref Range       Performed At

 

   



                 Albumin Fraction     3.7             3.5 - 5.5 g/dL     [facility]

 

   



                 Alpha 1 Fraction     0.4             0.2 - 0.4 g/dL     [facility]

 

   



                 Alpha 2 Fraction     0.6             0.5 - 0.9 g/dL     [facility]

 

   



                 Beta Fraction     0.9             0.6 - 1.1 g/dL     [facility]

 

   



                 Gamma Globulin Fraction     1.8 (H)         0.7 - 1.7 g/dL     [facility]

 

   



                     Interpretation      Slight polyclonal elevation of      [facility]



                           gamma fraction, suggesting      [facility]



                                         mild chronic inflammatory  



                                         response. No monoclonal bands  



                                         detected. No significant  



                                         change from previous study  



                                         performed 10-25-18.  

 

   



                     Pathologist:        Erika Watson MD      [facility]



                           (electronic signature)      [facility]

 

   



                 Protein, Total     7.4             6.0 - 8.3 gm/dL     [facility]













                                         Specimen

 





                                         Blood - Arm, Right









   



                 Performing Organization     Address         City/State/Zipcode     Phone Number

 

   



                 [facility]     [address] [place] [address] [phone]





                                         [facility]   





* TRANSFUSION SERVICE REPORT - SCAN (10/26/2018  5:53 PM CDT)





 



                           Narrative                 Performed At

 

 



                                         This result has an attachment that is not available. 





* ULTRAFILTRATION HD CRRT (10/26/2018 11:49 AM CDT)





 



                           Narrative                 Performed At

 

 



                                         Gurpreet Stoner RN 10/26/2018 11:49 AM 



                                         Pt had ultrafiltration done this morning via left forearm AVF. 



                                         Tolerated treatment well, NET UF=3 L. 



                                         Lab Results 



                                         Component Value Date 



                                         WBC 4.9 10/25/2018 



                                         HGB 10.9 (L) 10/25/2018 



                                         HCT 36.2 (L) 10/25/2018 



                                         MCV 85.0 10/25/2018 



                                          (L) 10/25/2018 



                                         Lab Results 



                                         Component Value Date 



                                         HEPBSAG Nonreactive 10/25/2018 



                                         ] 



                                         Lab Results 



                                         Component Value Date 



                                         HEPBIGM Nonreactive 10/28/2014 



                                         HEPCAB Nonreactive 10/28/2014 



                                         Lab Results 



                                         Component Value Date 



                                         HIV1X2 Nonreactive 10/28/2014 



                                         Lab Results 



                                         Component Value Date 



                                         GLUCOSE 73 10/25/2018 



                                         CALCIUM 8.5 10/25/2018 



                                          10/25/2018 



                                         K 4.7 10/25/2018 



                                         CO2 30 (H) 10/25/2018 



                                         CL 96 (L) 10/25/2018 



                                         BUN 33 (H) 10/25/2018 



                                         CREATININE 8.23 (H) 10/25/2018 



                                         Coag Profile: 



                                         Protime 



                                         Date Value Ref Range Status 



                                         10/25/2018 16.6 (H) 11.7 - 14.7 seconds Final 



                                         INR 



                                         Date Value Ref Range Status 



                                         10/25/2018 1.3 <=5.9 Final 



                                         PTT 



                                         Date Value Ref Range Status 



                                         10/25/2018 32.4 22.5 - 36.0 seconds Final 





* Type and screen, automated (10/25/2018  8:27 AM CDT)





   



                 Component       Value           Ref Range       Performed At

 

   



                     ABO/RH AUTOMATED (BEAKER)     O POSITIVE          [facility]

 

   



                     Ab Scrn             NEGATIVE            [facility]













                                         Specimen

 





                                         Blood









   



                 Performing Organization     Address         City/[facility]     Phone Number

 

   



                 [address]   





* Reticulocyte count (10/25/2018  6:29 AM CDT)





   



                 Component       Value           Ref Range       Performed At

 

   



                 % Retic         1.5             0.5 - 1.8 %     [facility]













                                         Specimen

 





                                         Blood









   



                 Performing Organization     Address         City/[facility]     Phone Number

 

   



                 [address]   





* Folate, RBC (10/25/2018  6:29 AM CDT)





   



                 Component       Value           Ref Range       Performed At

 

   



                 Folate, Rbc     753             >280 ng/mL RBC     [facility]



                                         [facility]













                                         Specimen

 





                                         Blood









 



                           Narrative                 Performed At

 

 



                           Performing Lab            [facility]



                            [facility]



                                          [facility] [address] [address] 



                                          PRINCE Villafana MD, PhD, AYUSH 









   



                 Performing Organization     Address         City/[facility]     Phone Number

 

   



                     [facility]     [facility], [address] [place], [place] [address]      [id number] 





* Peripheral Blood Smear - Hold only (10/25/2018  6:25 AM CDT)





   



                 Component       Value           Ref Range       Performed At

 

   



                     Peripheral Smear Save     saved               [facility]













                                         Specimen

 





                                         Blood









   



                 Performing Organization     Address         City/[facility]/Zipcode     Phone Number

 

   



                 [address] [address]     [phone] [place] [address]   





* Fibrinogen (10/25/2018  6:25 AM CDT)





   



                 Component       Value           Ref Range       Performed At

 

   



                 Fibrinogen      318             225 - 434 mg/dl     [facility]













                                         Specimen

 





                                         Blood









   



                 Performing Organization     Address         City/[facility]     Phone Number

 

   



                 [place] [address]     [phone]   





* D-dimer (10/25/2018  6:25 AM CDT)





   



                 Component       Value           Ref Range       Performed At

 

   



                 D-Dimer, Quant     1.31 (H)        <0.50 MG/L FEU     [facility]













                                         Specimen

 





                                         Blood









 



                           Narrative                 Performed At

 

 



                                         Intended Use: The D-Dimer Assay can be used to aid in the diagnosis of Deep Vein

                                         [facility]



                           Thrombosis (DVT) and Pulmonary Embolism Disease (PED).     [facility]



                                         In patients with low pre-test probability, various studies concerning STA 



                                         Liatest D-dimer test have reported that with a cutoff value of 0.50 MG/L FEU, 



                                         the Negative Predictive Value (NPV) regarding the exclusion of thrombosis is 



                                         within % range. 









   



                 Performing Organization     Address         City/State/[facility]     Phone Number

 

   



                 [place] [address]     [phone]   





* HIV-1 Antigen with HIV-1/2 Antibody (10/25/2018  6:24 AM CDT)





   



                 Component       Value           Ref Range       Performed At

 

   



                 HIV-1 Antigen with HIV     NON-REACTIVE     Nonreactive     [facility]



                           1&2 Antibody              [facility]













                                         Specimen

 





                                         Blood









   



                 Performing Organization     Address         City/[facility]     Phone Number

 

   



                 [address] [address]     [phone] [place] [address]   





* Rheumatoid factor Ab, reflex to titer (10/25/2018  6:24 AM CDT)





   



                 Component       Value           Ref Range       Performed At

 

   



                     Rheumatoid Factor     Positive            [facility]













                                         Specimen

 





                                         Blood









   



                 Performing Organization     Address         City/[facility]/Zipcode     Phone Number

 

   



                 [address] [address] [phone]





                                         [facility]   





* Rheumatoid factor titer (10/25/2018  6:24 AM CDT)





   



                 Component       Value           Ref Range       Performed At

 

   



                     Rheumatoid Factor TTR     1:2                 [facility]













                                         Specimen

 





                                         Blood









   



                 Performing Organization     Address         City/[facility]     Phone Number

 

   



                 [address] [address]     [phone]





                                         MEDICAL CENTER   





* Anti-Nuclear Antibody (MEGHAN) (10/25/2018  6:24 AM CDT)





   



                 Component       Value           Ref Range       Performed At

 

   



                 MEGHAN             Negative        Negative        [facility]













                                         Specimen

 





                                         Blood









 



                           Narrative                 Performed At

 

 



                           Test performed by IFA method.     [facility]



                           Test performed by IFA method.     [facility]









   



                 Performing Organization     Address         City/[facility]     Phone Number

 

   



                 [address] [address]     [phone] [address]   





* Vitamin B12 (10/25/2018  6:24 AM CDT)





   



                 Component       Value           Ref Range       Performed At

 

   



                 Vitamin B12     617             213 - 816 pg/mL     [facility]













                                         Specimen

 





                                         Blood









   



                 Performing Organization     Address         City/[facility]     Phone Number

 

   



                 [address] [address]     [phone] [place] [address]   





* EBV Viral Load (10/25/2018  6:23 AM CDT)





   



                 Component       Value           Ref Range       Performed At

 

   



                     EBV Viral Load      Negative or below the linear      [facility]



                           range of the assay (<500      [facility]



                                         copies/mL)  













                                         Specimen

 





                                         Blood









 



                           Narrative                 Performed At

 

 



                                         This assay was performed by real-time PCR for the detection of the Stacy-Cuello

                                         [facility]



                           virus (EBV) gene EBNA-1.The test is composed of (1) DNA extraction from     [facility]



                                         patient specimen, and (2) real-time PCR amplification and detection with 



                                         [id number] primers and probes. A well-conserved region of the EBNA-1 gene 





                                         is targeted, along with an internal control sequence used to confirm PCR 



                                         amplification. Asymptomatic carriers and viral genetic variation, among other 



                                         factors, can affect the accuracy of nucleic acid testing; therefore, results 



                                         should be interpreted in light of clinical data. 



                                         This test was developed and its performance characteristics determined by the 



                                         [facility] Pathology Department, Section of Molecular Pathology.

 



                                         It has not been cleared or approved by the U.S. Food and Drug Administration 



                                         (FDA), since FDA approval is not required for clinical use of the test. 



                                         Validation was done as required by The Clinical Laboratory Improvement 



                                         Amendments of 1988. 









   



                 Performing Organization     Address         City/[facility]/Zipcode     Phone Number

 

   



                 [facility]     [address] [place] [address]     [phone]





                                         [facility]   





* CMV PCR, quantitative (10/25/2018  6:23 AM CDT)





   



                 Component       Value           Ref Range       Performed At

 

   



                     CMV DNA Viral Load     Negative or below the linear      [facility]



                           range of the assay (<375      [facility]



                                         copies/mL)  













                                         Specimen

 





                                         Blood









 



                           Narrative                 Performed At

 

 



                           Cytomegalovirus (CMV) infection can cause significant disease in     [facility]



                           immunosuppressed patients. However, it is common for CMV to manifest as a     [facility]



                                         limited infection which is of no clinical significance in immunosuppressed 



                                         patients or in healthy individuals. 



                                         Viral load measurements are helpful to identify clinical CMV infection and to 



                                         guide the pre-emptive management of antiviral therapy.For treatment of CMV 





                                         infection due to reactivation in transplant recipients, a threshold between 



                                         4,000 and 5,000 copies/mL is suggested.For treatment of primary CMV 



                                         infection, a lower threshold can be used. 



                                         CMV infection may also be monitored using weekly serial measurements. Serial 



                                         measurements of CMV DNA viral load can be evaluated by identifying a 10-fold 



                                         change, as well as assessing the CMV DNA viral load and the clinical context for

 



                                         each patient. 



                                         The plasma CMV DNA viral load was detected using quantitative polymerase chain 





                                         reaction and fluorescent monitoring of a specific hybridized probe. Genetic 



                                         variation and other factors can affect the accuracy of nucleic acid testing. 



                                         Therefore, the results should be interpreted in light of clinical data. A 



                                         negative result may not exclude the presence of CMV disease. 



                                         This test was developed and its performance characteristics determined by the 



                                         [facility] Pathology Department, Section of Molecular Pathology.

 



                                         It has not been cleared or approved by the U.S. Food and Drug Administration 



                                         (FDA), since FDA approval is not required for clinical use of the test. 



                                         Validation was done as required by The Clinical Laboratory Improvement 



                                         Amendments of 1988. 









   



                 Performing Organization     Address         City/[facility]     Phone Number

 

   



                 [id number] [place] [address]     [phone]





                                         MEDICAL CENTER   





* Hepatitis B surface antigen (10/25/2018  6:23 AM CDT)





   



                 Component       Value           Ref Range       Performed At

 

   



                 hepatitis B Surface Ag     NON-REACTIVE     Nonreactive     [facility] [place]













                                         Specimen

 





                                         Blood









   



                 Performing Organization     Address         City/State/Zipcode     Phone Number

 

   



                 [facility]     [address] [place] [address] [phone]





                                         MEDICAL CENTER   





* Transesophageal echo (10/25/2018 12:00 AM CDT)





   



                 Component       Value           Ref Range       Performed At

 

   



                           Ejection Fraction         [facility]



                                         MKCKESSON CPA









 



                           Narrative                 Performed At

 

 



                           Transesophageal Echocardiography Report (JAEMS)     [facility]



                           Demographics              [facility]



                                         Patient Name BORIS PURI Date of Study 10/25/2018 



                                         NOEMI 



                                         [id number]

 



                                         Male 



                                         Visit Number 



                                         [id number] 



                                         [id number] Room Number 747 





                                         Number 



                                         Date of Birth1980Referring Physician Rom HUYNH 



                                         Age37 



                                         year(s)Sonographer Pramod Mansfield 



                                         Interpreting

 



                                         Eldon Bolaños 



                                         Physician

 



                                          MD 



                                         Fellow Soco TALLEY 



                                         Procedure 



                                         Type of 



                                         Study JAMES procedure:TRANSESOPHAGEAL ECHO 



                                         Indications:PFO . 



                                         Clinical History 



                                         CKD,ESRD,HTN 



                                         Height: 65 inches Weight: 73.48 kg (162 lbs) BSA: 1.81 m^2 BMI: 26.96 kg/m^2 



                                         HR: 92 bpm BP: 113/68 mmHg 



                                         Procedure Informed Consent 



                                         JAMES procedure notes 



                                         Moderate sedation by performing MD using 2 .5mg IV versed and 62.5mcg IV 



                                         fentanyl. . 



                                         Summary 



                                         IV saline contrast injection was negative for a PFO (patent foramen ovale) 



                                         at rest . 



                                         LA appendage morphology is simple (wind sock) . 



                                         No LA appendage Thrombus visualized. 



                                         LA is enlarged but severity assessment is unreliable due to known JAMES 



                                         sector size limitation. 



                                         Signature 



                                         ---------------------------------------------------------------- 



                                         Electronically signed by Eldon Bolaños MD(Interpreting 



                                         physician) on 10/30/2018 05:53 PM 



                                         ---------------------------------------------------------------- 



                                         Findings 



                                         Rhythm/BPRegular sinus rhythm during the exam. 



                                         Left Ventricle All of the LV segments have low normal contractility . 



                                         Global LV systolic function lower limits of 



                                         normal . 



                                         Left AtriumLA appendage morphology is simple (wind sock) . 



                                         No LA appendage Thrombus visualized. 



                                         LA is enlarged but severity assessment is 



                                         unreliable due to 



                                         known JAMES sector size limitation. 



                                         RightNormal RV size and systolic function. 



                                         Ventricle 



                                         Right Atrium RA size is mildly dilated. 



                                         Atrial SeptumIV saline contrast injection was negative for a PFO (patent 



                                         foramen ovale) at rest . 



                                         Aortic Valve Normal AoV structure and function. 



                                         Mitral Valve Mild MV leaflet thickening. 



                                         Moderate mitral regurgitation. 



                                         The mechanism for MR is leaflet tethering . 



                                         TricuspidNormal TV structure and function. 



                                         ValveMild tricuspid regurgitation. 



                                         Estimated peak systolic PA pressure is 30-35 



                                         mmHg + RA 



                                         pressure. 



                                         Pulmonic Valve Normal PV structure and function. 



                                         AortaAortic root size (SInus of Valsalva diameter) is 



                                         normal . 



                                         There is no evidence of aortic plaque. 



                                         PericardiumA small pericardial effusion is present . 



                                         The pericardial effusion appears circumferential

 



                                         . 









                                        Procedure Note

 

                                        



Interface, External Ris In - 10/30/2018  5:54 PM CDT



Transesophageal Echocardiography Report (JAMES)

 

 Demographics

 

 Patient Name   BORIS PURI     Date of Study       10/25/2018

                NOEMI

 

 MRN            [id number]          Gender              Male

 

 Visit Number   [id number]        Race                Unknown

 

 Accession      [id number]         Room Number         747

 Number

 

 Date of Birth  1980        Referring Physician Rom HUYNH

 

 Age            38 year(s)        Sonographer         Pramod Mansfield

 

                                  Interpreting        Eldon Bolaños,

                                  Physician           MD

 

 Fellow         Soco TALLEY

 

Procedure



 Type of

 Study     JAMES procedure:TRANSESOPHAGEAL ECHO

 

Indications:PFO .



Clinical History

CKD,ESRD,HTN



Height: 65 inches Weight: 73.48 kg (162 lbs) BSA: 1.81 m^2 BMI: 26.96 kg/m^2



HR: 92 bpm BP: 113/68 mmHg



 Procedure Informed Consent

 

 JAMES procedure notes

 Moderate sedation by performing MD using 2 .5mg IV versed and 62.5mcg IV

 fentanyl. .

 

 Summary

 IV saline contrast injection was negative for a PFO (patent foramen ovale)

 at rest .

 LA appendage morphology is simple (wind sock) .

 No LA appendage Thrombus visualized.

 LA is enlarged but severity assessment is unreliable due to known JAMES

 sector size limitation.

 

 Signature

 

 ----------------------------------------------------------------

 Electronically signed by Eldon Bolaños MD(Interpreting

 physician) on 10/30/2018 05:53 PM

 ----------------------------------------------------------------

 

 Findings

 

 Rhythm/BP      Regular sinus rhythm during the exam.

 

 Left Ventricle All of the LV segments have low normal contractility .

                Global LV systolic function lower limits of normal .

 

 Left Atrium    LA appendage morphology is simple (wind sock) .

                No LA appendage Thrombus visualized.

                LA is enlarged but severity assessment is unreliable due to

                known JAMES sector size limitation.

 

 Right          Normal RV size and systolic function.

 Ventricle

 

 Right Atrium   RA size is mildly dilated.

 

 Atrial Septum  IV saline contrast injection was negative for a PFO (patent

                foramen ovale) at rest .

 

 Aortic Valve   Normal AoV structure and function.

 

 Mitral Valve   Mild MV leaflet thickening.

                Moderate mitral regurgitation.

                The mechanism for MR is leaflet tethering .

 

 Tricuspid      Normal TV structure and function.

 Valve          Mild tricuspid regurgitation.

                Estimated peak systolic PA pressure is 30-35 mmHg + RA

                pressure.

 

 Pulmonic Valve Normal PV structure and function.

 

 Aorta          Aortic root size (SInus of Valsalva diameter) is normal .

                There is no evidence of aortic plaque.

 

 Pericardium    A small pericardial effusion is present .

                The pericardial effusion appears circumferential .

 









   



                 Performing Organization     Address         City/State/Zipcode     Phone Number

 

   



                                         [facility]   



                                         [facility]   





* CT chest without IV contrast (10/24/2018  3:24 AM CDT)





 



                           Narrative                 Performed At

 

 



                           FINAL REPORT              [facility]



                                         PATIENT ID: [id number] 



                                         CLINICAL INDICATION: Dyspnea, lung nodules in the lung bases on CT 



                                         abdomen performed 10/23/2018 



                                         COMPARISON: None 



                                         Multiple axial images of the chest were performed without IV contrast. 



                                         This exam was performed according to our departmental 



                                         dose-optimization program, which includes automated exposure control, 



                                         adjustment of the mA and/or kV according to patient size and/or use 



                                         of the iterative reconstruction technique. 



                                         FINDINGS: 



                                         Lung parenchyma: There are several semisolid nodules in both lungs. 



                                         The largest is in the left lower lobe and measures 1.1 x 0.6 cm. A 



                                         right lower lobe nodule measures 0.8 x 0.4 cm. A left lower lobe 



                                         nodule measures 0.9 x 0.6 cm. Several smaller semisolid nodules are 



                                         present elsewhere in the mid and upper lungs. 



                                         Curvilinear opacity in the left lower lobe, lingula and left upper 



                                         lobe suggests scarring or atelectasis. 



                                         Pleural effusion: Trace bilateral effusions 



                                         Pneumothorax: None. 



                                         Tracheobronchial tree: No significant findings. 



                                         Pulmonary vasculature: No significant findings. 



                                         Cardiac contours and great vessels: Cardiomegaly 



                                         Mediastinum: Trace pericardial effusion 



                                         Lymph Nodes: No adenopathy in the mediastinum or more. 



                                         Skeleton: No acute abnormality. 



                                         Limited images of upper abdomen: Upper abdominal ascites 



                                         IMPRESSION: 



                                         Several semisolid nodules in both lungs. The largest is in the left 



                                         lower lobe and measures 1.1 x 0.6 cm. Differential diagnostic 



                                         considerations include infectious, inflammatory and neoplastic 



                                         processes. Please see below for published recommendations for 



                                         follow-up. 



                                         Cardiomegaly, trace bilateral pleural effusions, trace pericardial 



                                         effusion and ascites. 



                                         2017 Fleischner Society Recommendations for Subsolid Lung Nodule 



                                         Follow-Up based on size (average of long- and short-axis diameters). 



                                         Use most suspicious nodule for followup. 



                                         Single

 



                                          



                                         <6 mm Ground glass: No routine follow-up 



                                         <6 mm Part solid: No routine follow-up 



                                         > or=6 mm Ground glass: CT at 6-12 months to confirm persistence, 



                                         then CT every 2 years until 5 years 



                                         > or=6 mm Part solid: CT at 3-6 months to confirm persistence, If 



                                         unchanged and solid component remains<6mm, annual CT should be 



                                         performed for 5 years. 



                                         Multiple 



                                         < 6mm: CT at 3-6 months.If stable consider CT at 2 and 4 years. 



                                         > or=6mm: CT at 3-6 months. Subsequent management based on the most 



                                         suspicious nodule(s). 



                                         Signed: Ramy Galvin MD 



                                         Report Verified Date/Time:10/24/2018 03:35:26 



                                         Reading Location: [address] Reading Room 



                                         Electronically signed by: RAMY GALVIN M.D. on 10/24/2018 03:35 





                                         AM 









                                        Procedure Note

 

                                        



Interface, External Ris In - 10/24/2018  3:37 AM CDT



FINAL REPORT

 

PATIENT ID:   [id number]

 

 

CLINICAL INDICATION: Dyspnea, lung nodules in the lung bases on CT 

abdomen performed 10/23/2018

 

COMPARISON: None

 

Multiple axial images of the chest were performed without IV contrast.

 

This exam was performed according to our departmental 

dose-optimization program, which includes automated exposure control, 

adjustment of the mA and/or kV according to patient size and/or use 

of the iterative reconstruction technique.

 

FINDINGS:

 

Lung parenchyma: There are several semisolid nodules in both lungs. 

The largest is in the left lower lobe and measures 1.1 x 0.6 cm. A 

right lower lobe nodule measures 0.8 x 0.4 cm. A left lower lobe 

nodule measures 0.9 x 0.6 cm. Several smaller semisolid nodules are 

present elsewhere in the mid and upper lungs.

 

Curvilinear opacity in the left lower lobe, lingula and left upper 

lobe suggests scarring or atelectasis.

 

Pleural effusion: Trace bilateral effusions

 

Pneumothorax: None.

 

Tracheobronchial tree: No significant findings.

 

Pulmonary vasculature: No significant findings.

 

Cardiac contours and great vessels: Cardiomegaly

 

Mediastinum: Trace pericardial effusion

 

Lymph Nodes: No adenopathy in the mediastinum or more.

 

Skeleton: No acute abnormality. 

 

Limited images of upper abdomen: Upper abdominal ascites

 

IMPRESSION:

 

Several semisolid nodules in both lungs. The largest is in the left 

lower lobe and measures 1.1 x 0.6 cm. Differential diagnostic 

considerations include infectious, inflammatory and neoplastic 

processes. Please see below for published recommendations for 

follow-up.

 

Cardiomegaly, trace bilateral pleural effusions, trace pericardial 

effusion and ascites.

 

                                        2017 Fleischner Society Recommendations for Subsolid Lung Nodule 

Follow-Up based on size (average of long- and short-axis diameters). 

Use most suspicious nodule for followup.

 

Single                                        

<6 mm Ground glass: No routine follow-up 

<6 mm Part solid: No routine follow-up 

 

> or=6 mm Ground glass: CT at 6-12 months to confirm persistence, 

then CT every 2 years until 5 years

> or=6 mm Part solid: CT at 3-6 months to confirm persistence, If 

unchanged and solid component remains<6mm, annual CT should be 

performed for 5 years.

 

Multiple

< 6mm: CT at 3-6 months.  If stable consider CT at 2 and 4 years. 

> or=6mm: CT at 3-6 months. Subsequent management based on the most 

suspicious nodule(s). 

 

Signed: Ramy Galvin MD

Report Verified Date/Time:  10/24/2018 03:35:26

 

Reading Location: [address] Reading Room

      Electronically signed by: RAMY GALVIN M.D. on 10/24/2018 03:35 AM

 









   



                 Performing Organization     Address         City/State/Zipcode     Phone Number

 

   



                                         [facility]   





* CT abdomen/pelvis with IV contrast (10/23/2018  5:50 PM CDT)





 



                           Narrative                 Performed At

 

 



                           FINAL REPORT              [facility]



                                         PATIENT ID: [id number] 



                                         HISTORY : Abdominal pain, unspecified 



                                         NAUSEA 



                                         EMESIS 



                                         Technique: Multiple axial images of the abdomen and pelvis were 



                                         performed with the administration of IV and oral contrast from the 



                                         lung bases to the pubic symphysis. Delayed images were also obtained. 



                                         This exam was performed according to our departmental dose 



                                         optimization program which includes automated exposure control, 



                                         adjustment of the mA and/or kV according to patient size and/or use 



                                         of iterative reconstructive technique. 



                                         COMPARISON : None 



                                         COMMENT : 



                                         There are trace bilateral pleural effusions. Adjacent consolidation 



                                         most likely represents atelectasis. There are several small nodular 



                                         lesion/foci in the lung bases. For example, in the left lower lobe, 



                                         there is a 0.8 cm nodular density. An additional nodular density is 



                                         also seen in the right lung base. These nodular foci are nonspecific. 



                                         While these may be related to an infectious/inflammatory process, 



                                         neoplastic/metastatic process cannot be excluded. Further evaluation 



                                         with a dedicated chest CT is advised. 



                                         The visualized spleen, adrenal glands, contracted gallbladder, 



                                         pancreas, stomach and duodenum are within normal limits. 



                                         The kidneys are atrophic. 



                                         There is hepatomegaly. 



                                         There is reflux of contrast into the inferior vena cava and hepatic 



                                         veins. The findings could be due to right-sided cardiac 



                                         dysfunction/failure. There is cardiomegaly. There is a small amount 



                                         of pericardial fluid/effusion. 



                                         There is no abdominal, retroperitoneal or pelvic lymphadenopathy. 



                                         There is some nonspecific infiltration of the subcutaneous fat around 



                                         the umbilicus of unclear etiology/significance. This appears to 



                                         extend intra-abdominally. No organized fluid collections are 



                                         definitively seen. Findings could be due to some localized 



                                         cellulitis/inflammation. The findings can be closely followed by CT 



                                         to exclude the possibility of a mass. 



                                         No free air is identified in the abdomen or pelvis. There is a small 



                                         amount of nonspecific free fluid in the abdomen and pelvis. No 



                                         findings of any bowel obstruction. 



                                         The small bowel is within normal limits. There is some long segment 



                                         wall thickening identified of the colon extending from the distal 



                                         transverse colon to the sigmoid colon. While the wall thickening 



                                         could be due to underdistention, a degree of nonspecific inflammatory 



                                         versus infectious versus ischemic colitis cannot be entirely 



                                         excluded, however. The appendix is questionably visualized. However, 



                                         there are no definite CT findings to suggest appendicitis at this 



                                         time. 



                                         The prostate gland is enlarged. The seminal vesicles are within 



                                         normal limits. 



                                         Impression: 



                                         1. Hepatomegaly. 



                                         2. Cardiomegaly with findings suggestive of right-sided cardiac 



                                         dysfunction/failure. 



                                         3. Small amount of abdominal ascites/free fluid. 



                                         4. Atrophic kidneys. 



                                         5. Several nodular foci in the lung bases. Correlation with a 



                                         dedicated chest CT is advised. 



                                         6. Nonspecific long segment wall thickening of the left colon, 



                                         underdistention versus nonspecific colitis. 



                                         7. Nonspecific edema/inflammation in the subcutaneous fat/soft 



                                         tissues around the umbilicus with some questionable extension 



                                         intra-abdominally. No organized fluid collections are seen. 



                                         Signed: Dominick Garcia MD 



                                         Report Verified Date/Time:10/23/2018 18:09:26 



                                         Reading Location: [facility] C013Y CT Body Reading Room 



                                          Electronically signed by: DOMINICK GARCIA M.D. on 10/23/2018 06:09 PM

 









                                        Procedure Note

 

                                        



Interface, External Ris In - 10/23/2018  6:11 PM CDT



FINAL REPORT

 

PATIENT ID:   [id number]

 

HISTORY : Abdominal pain, unspecified

NAUSEA

EMESIS

 

Technique: Multiple axial images of the abdomen and pelvis were 

performed with the administration of IV and oral contrast from the 

lung bases to the pubic symphysis. Delayed images were also obtained. 

This exam was performed according to our departmental dose 

optimization program which includes automated exposure control, 

adjustment of the mA and/or kV according to patient size and/or use 

of iterative reconstructive technique.

 

COMPARISON : None

 

COMMENT :

 

There are trace bilateral pleural effusions. Adjacent consolidation 

most likely represents atelectasis. There are several small nodular 

lesion/foci in the lung bases. For example, in the left lower lobe, 

there is a 0.8 cm nodular density. An additional nodular density is 

also seen in the right lung base. These nodular foci are nonspecific. 

While these may be related to an infectious/inflammatory process, 

neoplastic/metastatic process cannot be excluded. Further evaluation 

with a dedicated chest CT is advised.

 

The visualized spleen, adrenal glands, contracted gallbladder, 

pancreas, stomach and duodenum are within normal limits.

 

The kidneys are atrophic.

 

There is hepatomegaly.

 

There is reflux of contrast into the inferior vena cava and hepatic 

veins. The findings could be due to right-sided cardiac 

dysfunction/failure. There is cardiomegaly. There is a small amount 

of pericardial fluid/effusion.

 

There is no abdominal, retroperitoneal or pelvic lymphadenopathy.

 

There is some nonspecific infiltration of the subcutaneous fat around 

the umbilicus of unclear etiology/significance. This appears to 

extend intra-abdominally. No organized fluid collections are 

definitively seen. Findings could be due to some localized 

cellulitis/inflammation. The findings can be closely followed by CT 

to exclude the possibility of a mass.

 

No free air is identified in the abdomen or pelvis. There is a small 

amount of nonspecific free fluid in the abdomen and pelvis. No 

findings of any bowel obstruction.

 

The small bowel is within normal limits. There is some long segment 

wall thickening identified of the colon extending from the distal 

transverse colon to the sigmoid colon. While the wall thickening 

could be due to underdistention, a degree of nonspecific inflammatory 

versus infectious versus ischemic colitis cannot be entirely 

excluded, however. The appendix is questionably visualized. However, 

there are no definite CT findings to suggest appendicitis at this 

time. 

 

The prostate gland is enlarged. The seminal vesicles are within 

normal limits.

 

Impression:

 

                                        1. Hepatomegaly.

 

                                        2. Cardiomegaly with findings suggestive of right-sided cardiac 

dysfunction/failure.

 

                                        3. Small amount of abdominal ascites/free fluid.

 

                                        4. Atrophic kidneys.

 

                                        5. Several nodular foci in the lung bases. Correlation with a 

dedicated chest CT is advised.

 

                                        6. Nonspecific long segment wall thickening of the left colon, 

underdistention versus nonspecific colitis.

 

                                        7. Nonspecific edema/inflammation in the subcutaneous fat/soft 

tissues around the umbilicus with some questionable extension 

intra-abdominally. No organized fluid collections are seen.

 

Signed: Dominick Garcia MD

Report Verified Date/Time:  10/23/2018 18:09:26

 

Reading Location: [facility] C013Y CT Body Reading Room

       Electronically signed by: DOMINICK GARCIA M.D. on 10/23/2018 06:09 PM

 









   



                 Performing Organization     Address         City/State/Zipcode     Phone Number

 

   



                                         [facility]   





* Lipase (10/23/2018  1:52 PM CDT)





   



                 Component       Value           Ref Range       Performed At

 

   



                 Lipase          10              8 - 78 U/L      [facility]













                                         Specimen

 





                                         Blood - Arm, Left









   



                 Performing Organization     Address         City/[facility]     Phone Number

 

   



                 [address] [id number] [phone]   





* Amylase (10/23/2018  1:52 PM CDT)





   



                 Component       Value           Ref Range       Performed At

 

   



                 Amylase         77              25 - 125 U/L     [facility]













                                         Specimen

 





                                         Blood - Arm, Left









   



                 Performing Organization     Address         City/[facility]     Phone Number

 

   



                 [address] [address]     [phone] [address]   





* Hepatic function panel (10/23/2018  1:52 PM CDT)





   



                 Component       Value           Ref Range       Performed At

 

   



                 Protein, Total     8.6 (H)         6.0 - 8.3 gm/dL     [facility]

 

   



                 Albumin         4.4             3.5 - 5.0 g/dL     [facility]

 

   



                 Total Bilirubin     1.2             0.2 - 1.2 mg/dL     [facility]

 

   



                 Bilirubin, Direct     0.8 (H)         0.1 - 0.5 mg/dL     [facility]

 

   



                 Alkaline Phosphatase     167 (H)         40 - 150 U/L     [facility]

 

   



                 AST             20              5 - 34 U/L      [facility]

 

   



                 ALT             11              6 - 55 U/L      [facility]













                                         Specimen

 





                                         Blood - Arm, Left









   



                 Performing Organization     Address         City/[facility]     Phone Number

 

   



                 [place] [address]     [phone]   





* ECHOCARDIOGRAM REPORT - SCAN (10/19/2018  3:23 PM CDT)





 



                           Narrative                 Performed At

 

 



                                         This result has an attachment that is not available. 





* US abdomen complete (10/19/2018  3:08 PM CDT)





 



                           Narrative                 Performed At

 

 



                           FINAL REPORT              [facility]



                                         PATIENT ID: [id number] 



                                         Ultrasound of the Abdomen, 10/19/2018. 



                                         Clinical History:Abdominal pain, ascites. 



                                         Comparison: 10/28/2014. 



                                         Discussion: 



                                         Sonographic evaluation of the abdomen is performed. 



                                         Liver: 19.4 cm in length at the right midclavicular line, enlarged. 



                                         Normal echogenicity.Homogeneous echotexture.No mass.Main portal 



                                         vein diameter 1.6 cm. 



                                         Biliary tree:Common duct 4 mm.No biliary dilatation. 



                                         Gallbladder:No gallstones. Mild diffuse wall thickening. 2 mm 



                                         polyp.No pericholecystic fluid.Absent sonographic Murray sign. 



                                         Pancreas: Head, body, and proximal tail unremarkable. 



                                         Ascites:Trace ascites present. 



                                         Spleen:14.4 cm in length, mildly enlarged. 



                                         Kidneys: Right kidney 8.9 cm in length, small in size, with 



                                         cortical thickness of 0.7 cm.Left kidney 8.4 cm in length, normal 



                                         in size, with cortical thickness of 1.1 cm.Increased cortical 



                                         echogenicity.No mass.No shadowing calculus.No hydronephrosis. 



                                         IVC/Aorta:Segments partially seen.Unremarkable. 



                                         Impression: 



                                         1. Hepatosplenomegaly. 



                                         2. Trace ascites. 



                                         3. 2 mm gallbladder polyp, of doubtful clinical significance. 



                                         4. Mild gallbladder wall thickening without convincing evidence for 



                                         cholecystitis. Findings may reflect hypoproteinemia. 



                                         5. Appearance of kidneys compatible with chronic medical renal 



                                         disease. 



                                         Signed: Ara Webster MD 



                                         Report Verified Date/Time:10/19/2018 17:09:45 



                                         Reading Location: [address] Radiology Reading Room 



                                         Electronically signed by: ARA WEBSTER M.D. on 10/19/2018 05:09 





                                         PM 









                                        Procedure Note

 

                                        



Interface, External Ris In - 10/19/2018  5:11 PM CDT



FINAL REPORT

 

PATIENT ID:   [id number]

 

 

Ultrasound of the Abdomen, 10/19/2018.

 

Clinical History:  Abdominal pain, ascites.

 

Comparison: 10/28/2014.

 

Discussion:

Sonographic evaluation of the abdomen is performed.  

 

Liver:   19.4 cm in length at the right midclavicular line, enlarged.  

Normal echogenicity.  Homogeneous echotexture.  No mass.  Main portal 

vein diameter 1.6 cm.

 

 

Biliary tree:  Common duct 4 mm.  No biliary dilatation.

 

Gallbladder:  No gallstones. Mild diffuse wall thickening. 2 mm 

polyp.  No pericholecystic fluid.  Absent sonographic Murray sign.

 

Pancreas: Head, body, and proximal tail unremarkable.  

 

Ascites:  Trace ascites present.

 

Spleen:  14.4 cm in length, mildly enlarged.  

 

Kidneys:   Right kidney 8.9 cm in length, small in size, with 

cortical thickness of 0.7 cm.  Left kidney 8.4 cm in length, normal 

in size, with cortical thickness of 1.1 cm.  Increased cortical 

echogenicity.  No mass.  No shadowing calculus.  No hydronephrosis.

 

IVC/Aorta:  Segments partially seen.  Unremarkable.

 

Impression:

                                        1. Hepatosplenomegaly.

                                        2. Trace ascites.

                                        3. 2 mm gallbladder polyp, of doubtful clinical significance.

                                        4. Mild gallbladder wall thickening without convincing evidence for 

cholecystitis. Findings may reflect hypoproteinemia.

                                        5. Appearance of kidneys compatible with chronic medical renal 

disease.

 

Signed: Ara Webster MD

Report Verified Date/Time:  10/19/2018 17:09:45

 

Reading Location: [address] Radiology Reading Room

  Electronically signed by: ARA WEBSTER M.D. on 10/19/2018 05:09 PM

 









   



                 Performing Organization     Address         City/State/Zipcode     Phone Number

 

   



                                         [facility]   





* 2D Echo W/Doppler(CW/PW/Color) (10/19/2018  1:38 PM CDT)





   



                 Component       Value           Ref Range       Performed At

 

   



                           Ejection Fraction         [facility]



                                         [facility]









 



                           Narrative                 Performed At

 

 



                           Transthoracic Echocardiography Report (TTE)     [facility]



                           Demographics              [facility]



                                         Patient Name BORIS PURI Date of Study 10/19/2018 



                                         NOEMI 



                                         [id number]

 



                                         Male 



                                         Visit Number 



                                         [id number] 



                                         [id number] Room Number OP 



                                         Number 



                                         Date of Birth1980Referring Physician Ric SCOTT 



                                         Age37 



                                         year(s)Sonographer Ladan Arredondo, 



                                         

 



                                         CS 



                                         Interpreting

 



                                         Dilip Marie 



                                         Physician

 



                                          MD 



                                         Procedure 



                                         Type of 



                                         Study TTE procedure:2DECHO W DOPPLER(CW/PW/COLOR) (Routine) 



                                         Indications:Pre-surgical evaluation of organ transplant. 



                                         Clinical History 



                                         ESRD, HTN 



                                         Height: 65.5 inches Weight: 70.76 kg (156 lbs) BSA: 1.79 m^2 BMI: 25.56 



                                         kg/m^2 



                                         HR: 71 bpm BP: 147/89 mmHg 



                                         Summary 



                                         The left ventricle is chamber size (by vol index) is moderately enlarged 



                                         (male - LVED vol 90-100ml.m2) . Mild concentric LV hypertrophy. All of the 



                                         LV segments have low normal contractility . LVEF by Trujillo's method of 



                                         disk assessment is lower limits of normal (50-55%) . 



                                         Degree of diastolic dysfunction (LAP assessment) is inconclusive due to 



                                         significant MR . 



                                         RV chamber size is mildly enlarged . Global RV systolic function is mildly 



                                         reduced . 



                                         Moderate mitral regurgitation. 



                                         Mild to moderate tricuspid regurgitation. 



                                         Estimated peak systolic PA pressure is 45-50 mmHg . 



                                         A circumferential pericardial effusion is present . It is trivial to small 



                                         posteriorly and anteriorly and moderate ( 15mm) around the RA. 



                                         Pulmonary vein flow is consistent with increased LAP . 



                                         The estimated RA pressure by IVC dynamics 16-20mmHg . 



                                         Signature 



                                         ---------------------------------------------------------------- 



                                         Electronically signed by Dilip Marie MD(Interpreting 



                                         physician) on 10/19/2018 02:38 PM 



                                         ---------------------------------------------------------------- 



                                         Findings 



                                         Technical Quality: Technically adequate exam. 



                                         Left Ventricle The left ventricle is chamber size (by vol 



                                         index) 



                                         is moderately enlarged (male - 





                                         LVED vol 



                                         90-100ml.m2) . 



                                         Mild concentric LV hypertrophy.

 



                                         All of the LV segments have low

 



                                         normal 



                                         contractility . 



                                         LVEF by Trujillo's method of disk

 



                                         assessment is 



                                         lower limits of normal (50-55%)

 



                                         . 



                                         Degree of diastolic dysfunction

 



                                         (LAP assessment) is 



                                         inconclusive due to significant

 



                                         MR . 



                                         Left AtriumLA size is severely enlarged (>48 ml/m2) . 



                                         Right VentricleRV chamber size is mildly enlarged . 



                                         Global RV systolic function is 





                                         mildly reduced . 



                                         Right Atrium RA size is severely dilated. 



                                         Aortic Valve Normal AoV structure. 



                                         There is trace aortic 



                                         regurgitation. 



                                         Mitral Valve Normal MV structure. 



                                         Mild MV leaflet thickening. 



                                         Moderate mitral regurgitation. 





                                         Tricuspid ValveTV structure is normal. 



                                         Mild to moderate tricuspid 



                                         regurgitation. 



                                         Estimated peak systolic PA 



                                         pressure is 45-50 mmHg . 



                                         Pulmonic Valve Normal PV structure. 



                                         A trace of, Mild pulmonary 



                                         regurgitation. 



                                         AortaAortic root size (SInus of Valsalva 



                                         diameter) is 



                                         normal . 



                                         PericardiumA circumferential pericardial effusion is 



                                         present . 



                                         It is trivial to small 



                                         posteriorly and anteriorly 



                                         and moderate ( 15mm) around the

 



                                         RA. 



                                         IVC/SVC/PA/PV/PleuralPulmonary vein flow is consistent with increased 



                                         LAP . 



                                         The estimated RA pressure by IVC

 



                                         dynamics 16-20mmHg 



                                         . 



                                         Chambers/Structures 



                                         Left Atrium 



                                         LA Dimension: 5.85 cmLA Area: 33.57 cm^2 



                                         LA Volume: 125.43 ml 



                                         LA Vol. Index: 70 ml/m^2 



                                         Left Ventricle 



                                         LVIDd: 5.26 cm 



                                         LVIDs: 4.03 cm 



                                         LV Septum Diastolic: 1.32 cm 



                                         LV PW Diastolic: 1.35 cmLV FS: 23.4 % 



                                         LVEDV Trujillo's:168.44 ml 



                                         LVESV Trujillo's:81.39 mlLVEDVI: 94 



                                         ml/m^2 



                                         LVEF Trujillo's: 51.7 %LVESVI: 45 



                                         ml/m^2 



                                         LVOT Diameter: 2.03 cm 



                                         Aorta 



                                         Ao Root S of Jennie.: 2.91 cm 



                                         Doppler/Quantitative Measurements 



                                         LVOT 



                                         Peak Velocity: 1.16 m/s Peak Gradient: 5.42 mmHg 



                                         Mean Velocity: 0.74 m/s Mean Gradient: 2.55 mmHg 



                                         LVOT Diameter: 2.03 cmLVOT VTI: 21.46 cm 



                                         LVOT Area: 3.24 cm^2LVOT SV:69.42 ml 



                                         LVOT CO: 4.93 l/min LVOT CI: 2.75 l/min/m^2 









                                        Procedure Note

 

                                        



Interface, External Ris In - 10/19/2018  2:38 PM CDT



Transthoracic Echocardiography Report (TTE)



 Demographics

 

 Patient Name   BORIS PURI     Date of Study       10/19/2018

                NOEMI

 

 MRN            [id number]          Gender              Male

 

 Visit Number   [id number]        Race                Unknown

 

 Accession      [id number]         Room Number         OP

 Number

 

 Date of Birth  1980        Referring Physician Ric SCOTT

 

 Age            38 year(s)        Sonographer         Ladan Arredondo,

                                                      JONEL

 

                                  Interpreting        Dilip Marie

                                  Physician           MD

 

Procedure



 Type of

 Study     TTE procedure:2DECHO W DOPPLER(CW/PW/COLOR) (Routine)

 

Indications:Pre-surgical evaluation of organ transplant.



Clinical History

ESRD, HTN



Height: 65.5 inches Weight: 70.76 kg (156 lbs) BSA: 1.79 m^2 BMI: 25.56

kg/m^2



HR: 71 bpm BP: 147/89 mmHg



 Summary

 The left ventricle is chamber size (by vol index) is moderately enlarged

 (male - LVED vol 90-100ml.m2) . Mild concentric LV hypertrophy. All of the

 LV segments have low normal contractility . LVEF by Trujillo's method of

 disk assessment is lower limits of normal (50-55%) .

 

 Degree of diastolic dysfunction (LAP assessment) is inconclusive due to

 significant MR .

 

 RV chamber size is mildly enlarged . Global RV systolic function is mildly

 reduced .

 

 Moderate mitral regurgitation.

 

 Mild to moderate tricuspid regurgitation.

 

 Estimated peak systolic PA pressure is 45-50 mmHg .

 

 A circumferential pericardial effusion is present . It is trivial to small

 posteriorly and anteriorly and moderate ( 15mm) around the RA.

 

 Pulmonary vein flow is consistent with increased LAP .

 

 The estimated RA pressure by IVC dynamics 16-20mmHg .

 

 Signature

 

 ----------------------------------------------------------------

 Electronically signed by Dilip Marie MD(Interpreting

 physician) on 10/19/2018 02:38 PM

 ----------------------------------------------------------------

 

 Findings

 

Technical Quality: Technically adequate exam.



 Left Ventricle         The left ventricle is chamber size (by vol index)

                        is moderately enlarged (male - LVED vol

                        90-100ml.m2) .

                        Mild concentric LV hypertrophy.

                        All of the LV segments have low normal

                        contractility .

                        LVEF by Trujillo's method of disk assessment is

                        lower limits of normal (50-55%) .

                        Degree of diastolic dysfunction (LAP assessment) is

                        inconclusive due to significant MR .

 

 Left Atrium            LA size is severely enlarged (>48 ml/m2) .

 

 Right Ventricle        RV chamber size is mildly enlarged .

                        Global RV systolic function is mildly reduced .

 

 Right Atrium           RA size is severely dilated.

 

 Aortic Valve           Normal AoV structure.

                        There is trace aortic regurgitation.

 

 Mitral Valve           Normal MV structure.

                        Mild MV leaflet thickening.

                        Moderate mitral regurgitation.

 

 Tricuspid Valve        TV structure is normal.

                        Mild to moderate tricuspid regurgitation.

                        Estimated peak systolic PA pressure is 45-50 mmHg .

 

 Pulmonic Valve         Normal PV structure.

                        A trace of, Mild pulmonary regurgitation.

 

 Aorta                  Aortic root size (SInus of Valsalva diameter) is

                        normal .

 

 Pericardium            A circumferential pericardial effusion is present .

                        It is trivial to small posteriorly and anteriorly

                        and moderate ( 15mm) around the RA.

 

 IVC/SVC/PA/PV/Pleural  Pulmonary vein flow is consistent with increased

                        LAP .

                        The estimated RA pressure by IVC dynamics 16-20mmHg

                        .

 

Chambers/Structures



 Left Atrium

 

 LA Dimension: 5.85 cm                  LA Area: 33.57 cm^2

 LA Volume: 125.43 ml

 LA Vol. Index: 70 ml/m^2

 

 Left Ventricle

 

 LVIDd: 5.26 cm

 LVIDs: 4.03 cm

 LV Septum Diastolic: 1.32 cm

 LV PW Diastolic: 1.35 cm                    LV FS: 23.4 %

 LVEDV Trujillo's:168.44 ml

 LVESV Trujillo's:81.39 ml                    LVEDVI: 94 ml/m^2

 LVEF Trujillo's: 51.7 %                      LVESVI: 45 ml/m^2

 

 LVOT Diameter: 2.03 cm

 

Aorta

 

 Ao Root S of Jennie.: 2.91 cm

 

Doppler/Quantitative Measurements



 LVOT

 

 Peak Velocity: 1.16 m/s             Peak Gradient: 5.42 mmHg

 Mean Velocity: 0.74 m/s             Mean Gradient: 2.55 mmHg

 LVOT Diameter: 2.03 cm              LVOT VTI: 21.46 cm

 LVOT Area: 3.24 cm^2                LVOT SV:69.42 ml

 LVOT CO: 4.93 l/min                 LVOT CI: 2.75 l/min/m^2

 









   



                 Performing Organization     Address         City/[facility]     Phone Number

 

   



                                         [facility]   



                                         [facility]   





* FLOW PRA CLASS II WITH REFLEX TO ANTIBODY SPECIFICITY (2018 10:34 AM 
  CDT)



Only the most recent of 3 results within the time period is included.





   



                 Component       Value           Ref Range       Performed At

 

   



                     Flow Class II Percent     0                   [facility]



                                         Positive   

 

   



                           Flow Class Report         [facility]



                                         Comments   













                                         Specimen

 





                                         Blood









 



                           Narrative                 Performed At

 

 



                           Disclaimer:               [facility]



                                         This test was developed and its performance characteristics determined by the 



                                         [facility]. It has not been cleared or approved by the U.S. Food and Drug 



                                         Administration. The FDA has determined that such clearance or approval is not 



                                         necessary. This test is used for clinical purposes. It should not be regarded as

 



                                         investigational or for research. This laboratory is certified under the Clinical

 



                                         Laboratory Improvement Amendments of 1988 (CLIA-88) as qualified to perform high

 



                                         complexity clinical laboratory testing. 









   



                 Performing Organization     Address         City/State/[facility] Cent[facility]     Phone Number

 

   



                     [facility]     [address]:  [id number],     [place] [address] 



                                         CLIA#[id number] CAP#[id number]  



                                         UNOS#[id number]  





* FLOW PRA CLASS I WITH REFLEX TO ANTIBODY SPECIFICITY (2018 10:34 AM CDT)



Only the most recent of 3 results within the time period is included.





   



                 Component       Value           Ref Range       Performed At

 

   



                     Flow Class I Percent     0                   [facility]



                                         Positive   

 

   



                           Flow Class Report         [facility]



                                         Comments   













                                         Specimen

 





                                         Blood









 



                           Narrative                 Performed At

 

 



                           Disclaimer:               [facility]



                                         This test was developed and its performance characteristics determined by the 



                                         [facility]. It has not been cleared or approved by the U.S. Food and Drug 



                                         Administration. The FDA has determined that such clearance or approval is not 



                                         necessary. This test is used for clinical purposes. It should not be regarded as

 



                                         investigational or for research. This laboratory is certified under the Clinical

 



                                         Laboratory Improvement Amendments of 1988 (CLIA-88) as qualified to perform high

 



                                         complexity clinical laboratory testing. 









   



                 Performing Organization     Address         City/State/Zipcode     Phone Number

 

   



                     [facility]     [address] [place]:  [id number],     [place] [address] 



                                         CLIA#[id number] CAP#[id number]  



                                         UNOS#[id number]  





after 2017



Insurance







     



            Payer      Benefit     Subscriber ID     Type       Phone      Address



                                         Plan /    



                                         Group    

 

     



            BLUE CROSS/BLUE SHIELD     [employer]     xxxxxxxxxxxxxxx     PPO        [phone]     [id number]



                           POS/PPO/[place] [address]



                                         O    

 

     



                 [employer] - [employer] C[employer]     [employer] HMO       xxxxxxxxx       HMO/POS  



                                         POS QPOS    









     



            Guarantor Name     Account     Relation to     Date of     Phone      Billing Address



                     Type                Patient             Birth  

 

     



            Boris Puri     Personal/F     Self       1980     [phone]     [address] [place] [address]               ([place])              [place] [address]







Advance Directives





For more information, please contact:



[facility]



[address] [place], [place] [id number] [phone]









                          Date Inactivated          Comments



                           Code Status               Date Activated  

 

                          2018 10:46 PM        



                           Full Code                 10/26/2018  3:07 AM  









  



                           This code status was determined by:     Patient 









                                                     

 

                          10/25/2018  3:20 PM        



                           Full Code                 10/25/2018  1:56 PM  









  



                           This code status was determined by:     Patient 









                                                     

 

                          10/25/2018  1:56 PM        



                           Full Code                 10/25/2018  6:25 AM  









  



                           This code status was determined by:     Patient

## 2018-12-01 VITALS — SYSTOLIC BLOOD PRESSURE: 114 MMHG | DIASTOLIC BLOOD PRESSURE: 74 MMHG

## 2018-12-01 VITALS — SYSTOLIC BLOOD PRESSURE: 133 MMHG | DIASTOLIC BLOOD PRESSURE: 81 MMHG

## 2018-12-01 VITALS — DIASTOLIC BLOOD PRESSURE: 74 MMHG | SYSTOLIC BLOOD PRESSURE: 126 MMHG

## 2018-12-01 RX ADMIN — HYDROMORPHONE HYDROCHLORIDE PRN MG: 2 INJECTION INTRAMUSCULAR; INTRAVENOUS; SUBCUTANEOUS at 03:10

## 2018-12-01 RX ADMIN — SODIUM CHLORIDE PRN MG: 900 INJECTION INTRAVENOUS at 03:10

## 2018-12-01 RX ADMIN — HYDROCODONE BITARTRATE AND ACETAMINOPHEN PRN EA: 7.5; 325 TABLET ORAL at 08:53

## 2018-12-01 RX ADMIN — Medication SCH MG: at 08:52

## 2018-12-01 RX ADMIN — LABETALOL HCL SCH MG: 200 TABLET, FILM COATED ORAL at 08:52

## 2018-12-01 RX ADMIN — SEVELAMER CARBONATE SCH MG: 800 TABLET, FILM COATED ORAL at 08:48

## 2018-12-01 NOTE — NUR
patient appears to be resting quietly. no c/o pain noted at this time. family remains at the 
bedside.

## 2018-12-01 NOTE — NUR
patient medicated with dilaudid 1mg and zofran 4mg ivp for c/o rectal pain 8/10. will 
continue to monitor.